# Patient Record
Sex: MALE | Race: WHITE | NOT HISPANIC OR LATINO | URBAN - METROPOLITAN AREA
[De-identification: names, ages, dates, MRNs, and addresses within clinical notes are randomized per-mention and may not be internally consistent; named-entity substitution may affect disease eponyms.]

---
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
## 2022-01-06 ENCOUNTER — OFFICE VISIT (OUTPATIENT)
Dept: OPTOMETRY | Facility: CLINIC | Age: 22
End: 2022-01-06
Payer: COMMERCIAL

## 2022-01-06 DIAGNOSIS — Q89.8 STICKLER SYNDROME: ICD-10-CM

## 2022-01-06 DIAGNOSIS — H52.13 MYOPIA OF BOTH EYES: ICD-10-CM

## 2022-01-06 DIAGNOSIS — H35.342 MACULAR HOLE OF LEFT EYE: Primary | ICD-10-CM

## 2022-01-06 ASSESSMENT — REFRACTION_MANIFEST
OS_SPHERE: -11.00
OD_AXIS: 110
OS_AXIS: 030
OS_CYLINDER: +0.50
OD_SPHERE: -3.00
OD_CYLINDER: +0.75

## 2022-01-06 ASSESSMENT — CONF VISUAL FIELD
OD_NORMAL: 1
OS_NORMAL: 1

## 2022-01-06 ASSESSMENT — CUP TO DISC RATIO
OD_RATIO: 0.2
OS_RATIO: 0.3

## 2022-01-06 ASSESSMENT — VISUAL ACUITY
OS_CC+: -2
CORRECTION_TYPE: GLASSES
OD_CC: 20/60
OD_CC+: -2
OS_CC: 20/40
METHOD: SNELLEN - LINEAR

## 2022-01-06 ASSESSMENT — TONOMETRY
OD_IOP_MMHG: 17
IOP_METHOD: ICARE
OS_IOP_MMHG: 22

## 2022-01-06 ASSESSMENT — EXTERNAL EXAM - LEFT EYE: OS_EXAM: NORMAL

## 2022-01-06 ASSESSMENT — EXTERNAL EXAM - RIGHT EYE: OD_EXAM: NORMAL

## 2022-01-06 NOTE — PROGRESS NOTES
Assessment/Plan  (H35.342) Macular hole of left eye  (primary encounter diagnosis)  Comment: Patient follows with Dr. Godwin at [facility]- is due to follow up.   Plan: Discussed findings with patient. Minimal acuity improvement today over habitual glasses, but patient is welcome to update specs if he'd like. High myopia and anisometropia may make patient a good candidate for contact lenses. Recommend CL evaluation if retina doctor is supportive. Because of patient's lack of binocularity and lack of prior history wearing CL's, insertion/removal training with an emphasis on good hygiene would be needed. Patient will schedule an appointment at [facility] with his retina specialist and may schedule a CL fitting/evaluation after that.     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 Continue care with retina clinic. Return to clinic with vision changes.     (H52.13) Myopia of both eyes  Plan: REFRACTION [[id number]]        New glasses Rx generated. No bifocal was put on right lens as patient does not use this eye for reading (it is pseudophakic).           45 minutes were spent on the date of the encounter doing chart review, history and exam, documentation, and further activities as noted above.    Complete documentation of historical and exam elements from today's encounter can  be found in the full encounter summary report (not reduplicated in this progress  note). I personally obtained the chief complaint(s) and history of present illness. I  confirmed and edited as necessary the review of systems, past medical/surgical  history, family history, social history, and examination findings as documented by  others; and I examined the patient myself. I personally reviewed the relevant tests,  images, and reports as documented above. I formulated and edited as necessary the  assessment and plan and discussed the findings and management plan with the  patient and  family.    Joseph Thomas, OD

## 2022-01-17 ENCOUNTER — TELEPHONE (OUTPATIENT)
Dept: OPHTHALMOLOGY | Facility: CLINIC | Age: 22
End: 2022-01-17
Payer: COMMERCIAL

## 2022-01-17 ENCOUNTER — OFFICE VISIT (OUTPATIENT)
Dept: OPTOMETRY | Facility: CLINIC | Age: 22
End: 2022-01-17
Payer: COMMERCIAL

## 2022-01-17 DIAGNOSIS — H35.342 MACULAR HOLE OF LEFT EYE: ICD-10-CM

## 2022-01-17 DIAGNOSIS — H52.32 ANISOMETROPIA AND ANISEIKONIA: Primary | ICD-10-CM

## 2022-01-17 DIAGNOSIS — H52.13 MYOPIA OF BOTH EYES: ICD-10-CM

## 2022-01-17 DIAGNOSIS — H52.31 ANISOMETROPIA AND ANISEIKONIA: Primary | ICD-10-CM

## 2022-01-17 DIAGNOSIS — Q89.8 STICKLER SYNDROME: ICD-10-CM

## 2022-01-17 ASSESSMENT — VISUAL ACUITY
METHOD: SNELLEN - LINEAR
OS_CC: 20/40
OD_CC: 20/70
CORRECTION_TYPE: GLASSES

## 2022-01-17 ASSESSMENT — TONOMETRY
IOP_METHOD: ICARE
OS_IOP_MMHG: 18
OD_IOP_MMHG: 17

## 2022-01-17 ASSESSMENT — REFRACTION_WEARINGRX
OD_AXIS: 110
OS_CYLINDER: +0.50
OS_AXIS: 030
SPECS_TYPE: SVL
OD_SPHERE: -3.00
OS_SPHERE: -11.00
OD_CYLINDER: +0.75

## 2022-01-17 ASSESSMENT — REFRACTION_MANIFEST
OS_CYLINDER: +0.50
OD_CYLINDER: +0.75
OS_SPHERE: -11.00
OD_AXIS: 110
OD_SPHERE: -3.00
OS_AXIS: 030

## 2022-01-17 ASSESSMENT — REFRACTION_CURRENTRX
OS_BRAND: ACUVUE OASYS 1 DAY
OD_BASECURVE: 8.5
OS_BASECURVE: 8.5
OD_CYLINDER: SPHERE
OD_SPHERE: -2.50
OS_CYLINDER: SPHERE
OD_DIAMETER: 14.3
OS_DIAMETER: 14.3
OS_SPHERE: -9.50
OD_BRAND: ACUVUE OASYS 1 DAY

## 2022-01-17 ASSESSMENT — CONF VISUAL FIELD
OS_NORMAL: 1
OD_NORMAL: 1

## 2022-01-17 ASSESSMENT — EXTERNAL EXAM - LEFT EYE: OS_EXAM: NORMAL

## 2022-01-17 ASSESSMENT — EXTERNAL EXAM - RIGHT EYE: OD_EXAM: NORMAL

## 2022-01-17 NOTE — TELEPHONE ENCOUNTER
[facility] Call Center    Phone Message    May a detailed message be left on voicemail: yes     Reason for Call: Other: Pt would like to know Dr. Lizarraga's thoughts on him wearing contacts. Would like to discuss. Thank you.     Action Taken: Message routed to:  [facility] EYE    Travel Screening: Not Applicable

## 2022-01-17 NOTE — PROGRESS NOTES
A/P  1.) Anisometropia/Aniseikonia with High Myopia left eye, low myopia right eye  -Aniso of 8D in glasses, s/p IOL dislocation right eye  -New CL fit for aniso, responds well today  -Good vision/comfort/fit with daily disposable CL's  -Successful I&R, reviewed CL care and hygiene with pt    CLRx updated. Will order 6 month supply and mail direct to pt. F/u prn with lens concerns, otherwise 1-2 years here. Seeing retina dept regularly.    I have confirmed the patient's CC, HPI and reviewed Past Medical History, Past Surgical History, Social History, Family History, Problem List, Medication List and agree with Tech note.     Brianna Anderson, LEXII FAAO FSLS    30+ min spent on the date of encounter in direct care/counseling/review of charts/prescribing/documentation    Per financial counselor [employer] should cover medically necessary diagnosis for Anisometropia/Aniseikonia    Contact Lens Billing  V-Code:  - Soft spherical  Final Contact Lens Rx       Brand Base Curve Diameter Sphere    Right Precision 1 Day 8.3 14.2 -2.50    Left Precision 1 Day 8.3 14.2 -9.50    Expiration Date: 1/17/24    Replacement: Daily    Solutions: n/a    Wearing Schedule: Daytime wear only   Rx also on MyChart        [facility] order mailed direct: [id number]  # of units: 4 boxes  Price per Unit: $60    This patient requires contact lenses that are medically necessary for either improvement in vision over spectacles, support of the ocular surface, or other therapeutic benefit. These are not cosmetic contact lenses.     Encounter Diagnoses   Name Primary?     Anisometropia and aniseikonia Yes     Myopia of both eyes      Stickler syndrome      Macular hole of left eye

## 2022-01-17 NOTE — TELEPHONE ENCOUNTER
I spoke to the patient after consulting with Dr. Lizarraga.  The patient should consult with [facility] about the contact lenses since they recently did a surgery for him.

## 2022-05-30 ENCOUNTER — HOSPITAL ENCOUNTER (EMERGENCY)
Facility: CLINIC | Age: 22
Discharge: HOME | End: 2022-05-31
Attending: EMERGENCY MEDICINE | Admitting: EMERGENCY MEDICINE
Payer: COMMERCIAL

## 2022-05-30 DIAGNOSIS — R07.9 CHEST PAIN, UNSPECIFIED TYPE: ICD-10-CM

## 2022-05-30 PROCEDURE — 93005 ELECTROCARDIOGRAM TRACING: CPT

## 2022-05-30 PROCEDURE — C9803 HOPD COVID-19 SPEC COLLECT: HCPCS

## 2022-05-30 PROCEDURE — 99285 EMERGENCY DEPT VISIT HI MDM: CPT | Mod: CS,25

## 2022-05-30 RX ORDER — KETOROLAC TROMETHAMINE 15 MG/ML
15 INJECTION, SOLUTION INTRAMUSCULAR; INTRAVENOUS ONCE
Status: COMPLETED | OUTPATIENT
Start: 2022-05-31 | End: 2022-05-31

## 2022-05-31 ENCOUNTER — APPOINTMENT (OUTPATIENT)
Dept: GENERAL RADIOLOGY | Facility: CLINIC | Age: 22
End: 2022-05-31
Attending: EMERGENCY MEDICINE
Payer: COMMERCIAL

## 2022-05-31 VITALS
DIASTOLIC BLOOD PRESSURE: 71 MMHG | TEMPERATURE: 96.6 F | HEART RATE: 60 BPM | OXYGEN SATURATION: 100 % | SYSTOLIC BLOOD PRESSURE: 125 MMHG | RESPIRATION RATE: 19 BRPM

## 2022-05-31 LAB
ANION GAP SERPL CALCULATED.3IONS-SCNC: 3 MMOL/L (ref 3–14)
ATRIAL RATE - MUSE: 85 BPM
BASOPHILS # BLD AUTO: 0 10E3/UL (ref 0–0.2)
BASOPHILS NFR BLD AUTO: 0 %
BUN SERPL-MCNC: 13 MG/DL (ref 7–30)
CALCIUM SERPL-MCNC: 9.4 MG/DL (ref 8.5–10.1)
CHLORIDE BLD-SCNC: 105 MMOL/L (ref 94–109)
CO2 SERPL-SCNC: 28 MMOL/L (ref 20–32)
CREAT SERPL-MCNC: 0.75 MG/DL (ref 0.66–1.25)
DIASTOLIC BLOOD PRESSURE - MUSE: NORMAL MMHG
EOSINOPHIL # BLD AUTO: 0.2 10E3/UL (ref 0–0.7)
EOSINOPHIL NFR BLD AUTO: 2 %
ERYTHROCYTE [DISTWIDTH] IN BLOOD BY AUTOMATED COUNT: 12.5 % (ref 10–15)
FLUAV RNA SPEC QL NAA+PROBE: NEGATIVE
FLUBV RNA RESP QL NAA+PROBE: NEGATIVE
GFR SERPL CREATININE-BSD FRML MDRD: >90 ML/MIN/1.73M2
GLUCOSE BLD-MCNC: 90 MG/DL (ref 70–99)
HCT VFR BLD AUTO: 44.5 % (ref 40–53)
HGB BLD-MCNC: 14.7 G/DL (ref 13.3–17.7)
IMM GRANULOCYTES # BLD: 0 10E3/UL
IMM GRANULOCYTES NFR BLD: 0 %
INTERPRETATION ECG - MUSE: NORMAL
LYMPHOCYTES # BLD AUTO: 3.6 10E3/UL (ref 0.8–5.3)
LYMPHOCYTES NFR BLD AUTO: 51 %
MCH RBC QN AUTO: 31.6 PG (ref 26.5–33)
MCHC RBC AUTO-ENTMCNC: 33 G/DL (ref 31.5–36.5)
MCV RBC AUTO: 96 FL (ref 78–100)
MONOCYTES # BLD AUTO: 0.9 10E3/UL (ref 0–1.3)
MONOCYTES NFR BLD AUTO: 13 %
NEUTROPHILS # BLD AUTO: 2.4 10E3/UL (ref 1.6–8.3)
NEUTROPHILS NFR BLD AUTO: 34 %
NRBC # BLD AUTO: 0 10E3/UL
NRBC BLD AUTO-RTO: 0 /100
P AXIS - MUSE: 53 DEGREES
PLATELET # BLD AUTO: 340 10E3/UL (ref 150–450)
POTASSIUM BLD-SCNC: 3.7 MMOL/L (ref 3.4–5.3)
PR INTERVAL - MUSE: 146 MS
QRS DURATION - MUSE: 98 MS
QT - MUSE: 348 MS
QTC - MUSE: 414 MS
R AXIS - MUSE: 65 DEGREES
RBC # BLD AUTO: 4.65 10E6/UL (ref 4.4–5.9)
RSV RNA SPEC NAA+PROBE: NEGATIVE
SARS-COV-2 RNA RESP QL NAA+PROBE: NEGATIVE
SODIUM SERPL-SCNC: 136 MMOL/L (ref 133–144)
SYSTOLIC BLOOD PRESSURE - MUSE: NORMAL MMHG
T AXIS - MUSE: 44 DEGREES
TROPONIN I SERPL HS-MCNC: 5 NG/L
VENTRICULAR RATE- MUSE: 85 BPM
WBC # BLD AUTO: 7.1 10E3/UL (ref 4–11)

## 2022-05-31 PROCEDURE — 85025 COMPLETE CBC W/AUTO DIFF WBC: CPT | Performed by: EMERGENCY MEDICINE

## 2022-05-31 PROCEDURE — 96374 THER/PROPH/DIAG INJ IV PUSH: CPT

## 2022-05-31 PROCEDURE — 84484 ASSAY OF TROPONIN QUANT: CPT | Performed by: EMERGENCY MEDICINE

## 2022-05-31 PROCEDURE — 71045 X-RAY EXAM CHEST 1 VIEW: CPT

## 2022-05-31 PROCEDURE — 36415 COLL VENOUS BLD VENIPUNCTURE: CPT | Performed by: EMERGENCY MEDICINE

## 2022-05-31 PROCEDURE — 250N000011 HC RX IP 250 OP 636: Performed by: EMERGENCY MEDICINE

## 2022-05-31 PROCEDURE — 87637 SARSCOV2&INF A&B&RSV AMP PRB: CPT | Performed by: EMERGENCY MEDICINE

## 2022-05-31 PROCEDURE — 80048 BASIC METABOLIC PNL TOTAL CA: CPT | Performed by: EMERGENCY MEDICINE

## 2022-05-31 RX ADMIN — KETOROLAC TROMETHAMINE 15 MG: 15 INJECTION, SOLUTION INTRAMUSCULAR; INTRAVENOUS at 00:05

## 2022-05-31 ASSESSMENT — ENCOUNTER SYMPTOMS
NAUSEA: 0
ABDOMINAL PAIN: 0
FEVER: 0
CHILLS: 0
COUGH: 0
HEADACHES: 0
VOMITING: 0
SHORTNESS OF BREATH: 0

## 2022-05-31 NOTE — ED PROVIDER NOTES
History   Chief Complaint:  Chest Pain    The history is provided by the patient.      Jalil Hernandez is a 22 year old male who presents with non-radiating upper left-sided chest pain, persisting since sudden onset approximately 8 hours ago. The patient was sitting down at work when he developed sharp pain to his chest as noted above. This pain initially worsened with movement and throughout the evening became additionally exacerbated with deep breathing. He does note some new chest congestion today, and with overall worsening symptoms grew concerned and presented to the ED at this time. The patient is otherwise well and denies any headache, fever, chills, nausea, emesis, cough, shortness of breath, or abdominal pain. He denies pertinent past medical problems including cancer, blood clotting, or other cardiac history. He denies family history of sudden cardiac death. The patient reports no drug or alcohol use. He is fully COVID-19 vaccinated and denies known exposures to this. He denies recent heavy lifting or trauma.    Review of Systems   Constitutional: Negative for chills and fever.   Respiratory: Negative for cough and shortness of breath.    Cardiovascular: Positive for chest pain (upper left-sided, non-radiating).   Gastrointestinal: Negative for abdominal pain, nausea and vomiting.   Neurological: Negative for headaches.   All other systems reviewed and are negative.    Allergies:  No known drug allergies    Medications:  The patient takes no daily medication.    Past Medical History:     Band-shaped keratopathy  Glaucoma suspect  Iritis  Myopia  Retinal detachment  Staphyloma posticum  Stickler's syndrome  Vitamin D deficiency    Past Surgical History:    Laser diode retina, combined  Reconstruction anterior chamber, right  Retinal reattachment  Cataract extraction, right  Scleral buckle  Strabismus surgery  Vitrectomy parsplana x4    Family History:    Retinal detachment    Social History:  The patient  presented with his brother.  The patient arrived in a private vehicle.    Physical Exam     Patient Vitals for the past 24 hrs:   BP Temp Temp src Pulse Resp SpO2   05/31/22 0100 125/71 -- -- 60 -- 100 %   05/31/22 0050 132/72 -- -- 57 19 100 %   05/31/22 0030 (!) 137/91 -- -- 65 15 100 %   05/31/22 0020 130/66 -- -- 59 13 100 %   05/31/22 0015 (!) 141/76 -- -- 54 18 99 %   05/30/22 2336 (!) 188/100 (!) 96.6  F (35.9  C) Temporal 77 18 100 %     Physical Exam  Nursing note and vitals reviewed.  Constitutional: Well nourished.   Eyes: Conjunctiva normal.  Pupils are equal, round, and reactive to light.   ENT: Nose normal. Mucous membranes pink and moist.    Neck: Normal range of motion.  CVS: Normal rate, regular rhythm.  Normal heart sounds.  No murmur. L. Anterior chest wall, just superior to L. Nipple with TTP, no overlying warmth/erythema/induration  Pulmonary: Lungs clear to auscultation bilaterally. No wheezes/rales/rhonchi.  GI: Abdomen soft. Nontender, nondistended. No rigidity or guarding.    MSK: No calf tenderness or swelling. No bilateral shoulder tenderness, full active ROM  Neuro: Alert. Follows simple commands.  Skin: Skin is warm and dry. No rash noted.   Psychiatric: Normal affect.     Emergency Department Course   ECG  ECG taken at 2343, ECG read at 2343  Normal sinus rhythm with sinus arrhythmia.  Normal ECG.  Rate 85 bpm. IN interval 146 ms. QRS duration 98 ms. QT/QTc 348/414 ms. P-R-T axis 53 65 44.     Imaging:  XR Chest Port 1 View   Final Result   IMPRESSION: Stable cardiomediastinal silhouette. No focal consolidation or pleural effusion.        Report per radiology    Laboratory:  Labs Ordered and Resulted from Time of ED Arrival to Time of ED Departure   BASIC METABOLIC PANEL - Normal       Result Value    Sodium 136      Potassium 3.7      Chloride 105      Carbon Dioxide (CO2) 28      Anion Gap 3      Urea Nitrogen 13      Creatinine 0.75      Calcium 9.4      Glucose 90      GFR Estimate  >90     TROPONIN I - Normal    Troponin I High Sensitivity 5     INFLUENZA A/B & SARS-COV2 PCR MULTIPLEX - Normal    Influenza A PCR Negative      Influenza B PCR Negative      RSV PCR Negative      SARS CoV2 PCR Negative     CBC WITH PLATELETS AND DIFFERENTIAL    WBC Count 7.1      RBC Count 4.65      Hemoglobin 14.7      Hematocrit 44.5      MCV 96      MCH 31.6      MCHC 33.0      RDW 12.5      Platelet Count 340      % Neutrophils 34      % Lymphocytes 51      % Monocytes 13      % Eosinophils 2      % Basophils 0      % Immature Granulocytes 0      NRBCs per 100 WBC 0      Absolute Neutrophils 2.4      Absolute Lymphocytes 3.6      Absolute Monocytes 0.9      Absolute Eosinophils 0.2      Absolute Basophils 0.0      Absolute Immature Granulocytes 0.0      Absolute NRBCs 0.0       Emergency Department Course:     Reviewed:  I reviewed nursing notes, vitals, past medical history and Care Everywhere.    Assessments:  2349 I obtained history and examined the patient as noted above.   0056 I rechecked the patient and explained findings. I believe that they are safe for discharge at this time.    Interventions:  0005 Toradol 15 mg IV    Disposition:  The patient was discharged to home.     Impression & Plan     Medical Decision Making:  Patient is a 22-year-old male presenting with chest pain.  He is noted to be hypertensive in triage though this down trended during his time in the ED.  EKG without focal ischemia or underlying arrhythmia.  High-sensitivity screening troponin negative.  His presentation would be atypical of ACS given symptoms greater than 6 hours.  PERC negative, clinically doubt PE.  Labs without profound anemia or significant electrolyte derangements.  He is not septic appearing.  He has no reproducible abdominal tenderness and I doubt intra-abdominal source to explain his pain.  Chest x-ray without focal pneumonia, fluid overload, widened mediastinum or pneumothorax.  He tested negative for  COVID-19/influenza during his time in the ED.  Patient did report some symptom improvement after IV Toradol.  Stronger suspicion that patient's pain is more secondary to costochondritis.  I did recommend Tylenol/Motrin as needed with plans for close PCP follow-up.  Return precautions given.  We also did discuss patient's noted hypertension on arrival and recommendation to follow-up with PCP and maintain blood pressure diary at home though stronger suspicion that his initial elevated blood pressure was more secondary to cuff sizing error.    Diagnosis:    ICD-10-CM    1. Chest pain, unspecified type  R07.9      Scribe Disclosure:  I, Kori Salazar, am serving as a scribe at 11:42 PM on 5/30/2022 to document services personally performed by Genevieve Beebe DO based on my observations and the provider's statements to me.       Genevieve Beebe,   05/31/22 0131

## 2022-05-31 NOTE — ED TRIAGE NOTES
Pt arrives to ED ith CP that began at 1600. No meds PTA. Denies hx. Pain worse with breathing and movement.

## 2022-11-20 ENCOUNTER — HEALTH MAINTENANCE LETTER (OUTPATIENT)
Age: 22
End: 2022-11-20

## 2023-02-08 DIAGNOSIS — H35.342 MACULAR HOLE OF LEFT EYE: ICD-10-CM

## 2023-02-08 DIAGNOSIS — H40.003 GLAUCOMA SUSPECT OF BOTH EYES: Primary | ICD-10-CM

## 2023-02-22 ENCOUNTER — OFFICE VISIT (OUTPATIENT)
Dept: OPHTHALMOLOGY | Facility: CLINIC | Age: 23
End: 2023-02-22
Attending: OPHTHALMOLOGY
Payer: COMMERCIAL

## 2023-02-22 DIAGNOSIS — H40.003 GLAUCOMA SUSPECT OF BOTH EYES: ICD-10-CM

## 2023-02-22 DIAGNOSIS — H35.342 MACULAR HOLE OF LEFT EYE: ICD-10-CM

## 2023-02-22 PROCEDURE — 99214 OFFICE O/P EST MOD 30 MIN: CPT | Performed by: OPHTHALMOLOGY

## 2023-02-22 PROCEDURE — 92133 CPTRZD OPH DX IMG PST SGM ON: CPT | Performed by: OPHTHALMOLOGY

## 2023-02-22 PROCEDURE — G0463 HOSPITAL OUTPT CLINIC VISIT: HCPCS | Mod: 25

## 2023-02-22 PROCEDURE — 99207 OCT OPTIC NERVE RNFL SPECTRALIS OU (BOTH EYES): CPT | Mod: 26 | Performed by: OPHTHALMOLOGY

## 2023-02-22 PROCEDURE — 99207 FUNDUS PHOTOS OU (BOTH EYES): CPT | Mod: 26 | Performed by: OPHTHALMOLOGY

## 2023-02-22 PROCEDURE — 92134 CPTRZ OPH DX IMG PST SGM RTA: CPT | Performed by: OPHTHALMOLOGY

## 2023-02-22 PROCEDURE — 92250 FUNDUS PHOTOGRAPHY W/I&R: CPT | Mod: 59 | Performed by: OPHTHALMOLOGY

## 2023-02-22 RX ORDER — TRETINOIN 0.5 MG/G
CREAM TOPICAL
COMMUNITY
Start: 2022-03-07 | End: 2023-08-31

## 2023-02-22 RX ORDER — CLINDAMYCIN PHOSPHATE 10 UG/ML
LOTION TOPICAL PRN
COMMUNITY
Start: 2022-01-21

## 2023-02-22 ASSESSMENT — VISUAL ACUITY
CORRECTION_TYPE: GLASSES
OD_CC+: -1
OD_CC: 20/60
OS_CC+: -1
METHOD: SNELLEN - LINEAR
OS_CC: 20/40

## 2023-02-22 ASSESSMENT — CONF VISUAL FIELD
OD_INFERIOR_NASAL_RESTRICTION: 0
OS_INFERIOR_TEMPORAL_RESTRICTION: 0
OD_SUPERIOR_NASAL_RESTRICTION: 0
OD_INFERIOR_TEMPORAL_RESTRICTION: 0
OS_INFERIOR_NASAL_RESTRICTION: 0
OD_NORMAL: 1
METHOD: COUNTING FINGERS
OD_SUPERIOR_TEMPORAL_RESTRICTION: 0
OS_SUPERIOR_TEMPORAL_RESTRICTION: 0
OS_SUPERIOR_NASAL_RESTRICTION: 0
OS_NORMAL: 1

## 2023-02-22 ASSESSMENT — REFRACTION_WEARINGRX
OD_SPHERE: -3.00
OS_AXIS: 030
OS_SPHERE: -11.00
OD_AXIS: 110
OS_CYLINDER: +0.50
OD_CYLINDER: +0.75
SPECS_TYPE: SVL

## 2023-02-22 ASSESSMENT — CUP TO DISC RATIO
OD_RATIO: 0.2
OS_RATIO: 0.3

## 2023-02-22 ASSESSMENT — TONOMETRY
OS_IOP_MMHG: 20
OD_IOP_MMHG: 17
IOP_METHOD: ICARE

## 2023-02-22 ASSESSMENT — EXTERNAL EXAM - LEFT EYE: OS_EXAM: NORMAL

## 2023-02-22 ASSESSMENT — SLIT LAMP EXAM - LIDS: COMMENTS: INFERIOR TEMPORAL PROMINENCE OF THE SCLERAL BUCKLE. NO EXPOSURE, BUT POSSIBLE SLIGHTLY DISPLACEMENT

## 2023-02-22 ASSESSMENT — EXTERNAL EXAM - RIGHT EYE: OD_EXAM: NORMAL

## 2023-02-22 NOTE — PROGRESS NOTES
CC -   Follow up stickler's s  HPI -  Jalil Hernandez is a  20 year old year-old patient with h/o Stickler's syndrome, with h/o multiple RD repair OD, laser pexy OS, and also RD OS s/p repair with SB.  Seen in past at [facility] by Antonio Pascal, Klesert, and Carlos A, now following with Kirsty Almazan and [facility].    s/p AC washout  OD for retained Sommering ring (12/19/18) by Dr. Almazan. He was diagnosed with uveitis in the right eye, now stable.  Status post Macula hole repair left eyeat [facility]    INTERVAL HISTORY - reports decreased vision left eye possibly over the past 6 months. No new  floaters and flashes in the left eye . No pain.   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 COL2A1 is associated with a spectrum of autosomal dominant and recessive conditions affecting the skeletal system.    PAST OCULAR SURGERY  AC washout OD 12/19/18 by Dr. Almazan  RD repair OD (multiple, last PPV/SO and SOR) 2005 & 2010 (Klesert)  SBP OS 2014 ([facility])  CE/IOL OD 2005 ([facility])    Ophtho Meds: None  Patient on pre-exams for nurse career    OCULAR IMAGING    OCT 02/22/23   right eye- retina thinning. vitreous traction,  retinoschisis  left eye- Macula hole closed. Retina thinning    OVF 06/10/21  LVF right eye: peripheral decreased sensitivity- does not appear glaucoma. Patient with descentered intraocular lens possibly causing visual field defects. FP 8%  OVF24-2 left eye: normal     ONFL 02/22/23   Right eye: T and some N thinning with possibly progression since 2013  Left eye: normal     PHOTOS optos 02/22/23  consistent with exam     ASSESSMENT & PLAN    # history of Stickler's syndrome  - reviewed RT/RD precautions  - genetic report: 03/03/22 COL2A1 is associated with a spectrum of autosomal dominant and recessive conditions affecting the skeletal system.    # history of full thickness macula hole, left eye  Status post  repair at [place] 8.13.2021 Dr. Godwin  - noted on exam (4-14-21) unclear onset. Patient reports 6 months of blurry vision left eye   Macula hole closed     # History of RD repair OD with residual TRD   - last surgery 2010 with PPV/SO and SO removal   - small residual superior peripheral TRD noted by JT in 2014, stable    # History of RD repair OS   - s/p prophlyactic pexy (Klesert)   - s/p SBP 2014 (JT)   - status post additional laser treatment 9.12.18 and 9.25.18   - scleral buckle displaced anteriorly, note exposed. Noted since 9.2018. No exposure. Per patient, no eye pain. Does not seem to Bother him. Unclear if displacement of buckle related to new Macula hole    -  no new tears, everything behind laser barricade.     # ERM OD with retinal traction   - may be residual posterior hyaloid- stable    - OCT shows some VRT with CME temporal mid-periphery   - OCT shows some SO under Epiretinal membrane    # History of Anterior uveitis, right eye   - likely was 2/2 retained of Sommering ring in anterior chamber causing inflammation   - s/p AC washout, retained silicone oil removed, loose fragment removed, but residual fragment imbedded in iris inferiorly and was left in place 12.2018   - no corneal edema; no activity   - discussed return precautions, restrictions    # Phacodonesis right eye, lens slightly  dislocated superiorly   - may need lens exchange if worsens, monitor for now     #  History of ocular hypertension   - ONFL left eye within normal limits; right eye with possible worsening thinning compared to 2021; possible inter-test variability and optic nerve tilted; left eye is normal    - IOP 17/20   - currently off eyedrops   - optic nerve with small cups; retina thinning  OVF 06/10/21 LVF right eye: peripheral decreased sensitivity- does not appear glaucoma. Patient with descentered intraocular lens possibly causing visual field defects. FP 8%  OVF24-2 left eye: normal   Recommend LVF right eye OVF24-2 left  eye next follow up - if worsening will consider glaucoma meds     # hordeolum   warm compresses and artificial tears  As needed     PLAN:   Follow up 6 months with retinal nerve fiber layer both eyes; LVF right eye OVF24-2 left eye. Macula Optical Coherence Tomography; dilation - if worsening will consider starting glaucoma meds   Retina detachment precautions were discussed with the patient (presence or increased in flashes, floaters or a curtain in the visual field) and was asked to return if any of the those occur   warm compresses and artificial tears      ~~~~~~~~~~~~~~~~~~~~~~~~~~~~~~~~~~   Complete documentation of historical and exam elements from today's encounter can be found in the full encounter summary report (not reduplicated in this progress note).  I personally obtained the chief complaint(s) and history of present illness.  I confirmed and edited as necessary the review of systems, past medical/surgical history, family history, social history, and examination findings as documented by others; and I examined the patient myself.  I personally reviewed the relevant tests, images, and reports as documented above.  I formulated and edited as necessary the assessment and plan and discussed the findings and management plan with the patient and family    Marine Lizarraga MD   of Ophthalmology.  Retina Service   Department of Ophthalmology and Visual Neurosciences   [facility]  Phone: [phone]   Fax: [phone]

## 2023-02-22 NOTE — NURSING NOTE
Chief Complaints and History of Present Illnesses   Patient presents with     Annual Eye Exam     Chief Complaint(s) and History of Present Illness(es)     Annual Eye Exam            Laterality: both eyes    Associated symptoms: floaters.  Negative for eye pain and flashes    Treatments tried: no treatments          Comments    Here for annual exam. Vision has decreased since last eye exam. Stable floaters without flashes. No eye pain.    Kendall MUHAMMAD 12:47 PM February 22, 2023

## 2023-06-27 ENCOUNTER — TELEPHONE (OUTPATIENT)
Dept: INTERNAL MEDICINE | Facility: CLINIC | Age: 23
End: 2023-06-27
Payer: COMMERCIAL

## 2023-06-27 NOTE — TELEPHONE ENCOUNTER
No same day openings today.  Patient can be scheduled wherever I have same-day openings or he can be scheduled with any  provider with openings

## 2023-06-27 NOTE — TELEPHONE ENCOUNTER
Patient called back to reschedule appt. CS told him about the note that ok's same day and provider approval spots can be used. He stated he assumed that meant he can have his px today. Writer explained the hold spots on providers scheduled and offered him first appt 8/1/2023. Patient said that was too far out and wants to be seen today since the note stated same day and wants to talk with a RN  Ok to call and  [phone]

## 2023-08-01 ENCOUNTER — TRANSFERRED RECORDS (OUTPATIENT)
Dept: HEALTH INFORMATION MANAGEMENT | Facility: CLINIC | Age: 23
End: 2023-08-01

## 2023-08-03 DIAGNOSIS — H40.003 GLAUCOMA SUSPECT OF BOTH EYES: ICD-10-CM

## 2023-08-03 DIAGNOSIS — H35.342 MACULAR HOLE OF LEFT EYE: Primary | ICD-10-CM

## 2023-08-21 ENCOUNTER — OFFICE VISIT (OUTPATIENT)
Dept: OPHTHALMOLOGY | Facility: CLINIC | Age: 23
End: 2023-08-21
Attending: OPHTHALMOLOGY
Payer: COMMERCIAL

## 2023-08-21 DIAGNOSIS — H40.003 GLAUCOMA SUSPECT OF BOTH EYES: ICD-10-CM

## 2023-08-21 DIAGNOSIS — H18.511 ENDOTHELIAL CORNEAL DYSTROPHY OF RIGHT EYE: Primary | ICD-10-CM

## 2023-08-21 DIAGNOSIS — H35.342 MACULAR HOLE OF LEFT EYE: ICD-10-CM

## 2023-08-21 DIAGNOSIS — H44.711: ICD-10-CM

## 2023-08-21 PROCEDURE — 92133 CPTRZD OPH DX IMG PST SGM ON: CPT | Performed by: OPHTHALMOLOGY

## 2023-08-21 PROCEDURE — G0463 HOSPITAL OUTPT CLINIC VISIT: HCPCS | Performed by: OPHTHALMOLOGY

## 2023-08-21 PROCEDURE — 92083 EXTENDED VISUAL FIELD XM: CPT | Performed by: OPHTHALMOLOGY

## 2023-08-21 PROCEDURE — 99214 OFFICE O/P EST MOD 30 MIN: CPT | Mod: GC | Performed by: OPHTHALMOLOGY

## 2023-08-21 PROCEDURE — 92286 ANT SGM IMG I&R SPECLR MIC: CPT | Performed by: OPHTHALMOLOGY

## 2023-08-21 PROCEDURE — 92134 CPTRZ OPH DX IMG PST SGM RTA: CPT | Performed by: OPHTHALMOLOGY

## 2023-08-21 PROCEDURE — 99207 OCT OPTIC NERVE RNFL SPECTRALIS OU (BOTH EYES): CPT | Mod: 26 | Performed by: OPHTHALMOLOGY

## 2023-08-21 RX ORDER — FINASTERIDE 5 MG/1
TABLET, FILM COATED ORAL EVERY MORNING
COMMUNITY

## 2023-08-21 ASSESSMENT — CONF VISUAL FIELD
OD_INFERIOR_NASAL_RESTRICTION: 0
OD_SUPERIOR_TEMPORAL_RESTRICTION: 0
OS_INFERIOR_TEMPORAL_RESTRICTION: 0
OS_INFERIOR_NASAL_RESTRICTION: 0
OD_INFERIOR_TEMPORAL_RESTRICTION: 0
OS_NORMAL: 1
OD_SUPERIOR_NASAL_RESTRICTION: 0
OS_SUPERIOR_TEMPORAL_RESTRICTION: 0
METHOD: COUNTING FINGERS
OS_SUPERIOR_NASAL_RESTRICTION: 0
OD_NORMAL: 1

## 2023-08-21 ASSESSMENT — REFRACTION_WEARINGRX
OD_AXIS: 110
OD_CYLINDER: +0.75
OS_AXIS: 030
OS_SPHERE: -11.00
OS_CYLINDER: +0.50
OD_SPHERE: -3.00
SPECS_TYPE: SVL

## 2023-08-21 ASSESSMENT — VISUAL ACUITY
OS_CC+: -2
OS_CC: 20/40
CORRECTION_TYPE: GLASSES
OD_CC+: -1
OD_CC: 20/60
METHOD: SNELLEN - LINEAR

## 2023-08-21 ASSESSMENT — SLIT LAMP EXAM - LIDS: COMMENTS: INFERIOR TEMPORAL PROMINENCE OF THE SCLERAL BUCKLE. NO EXPOSURE, BUT POSSIBLE SLIGHTLY DISPLACEMENT

## 2023-08-21 ASSESSMENT — PACHYMETRY
OD_CT(UM): 597
OS_CT(UM): 604

## 2023-08-21 ASSESSMENT — EXTERNAL EXAM - RIGHT EYE: OD_EXAM: NORMAL

## 2023-08-21 ASSESSMENT — CUP TO DISC RATIO
OD_RATIO: 0.2
OS_RATIO: 0.3

## 2023-08-21 ASSESSMENT — EXTERNAL EXAM - LEFT EYE: OS_EXAM: NORMAL

## 2023-08-21 ASSESSMENT — TONOMETRY
OD_IOP_MMHG: 14
OS_IOP_MMHG: 13
IOP_METHOD: TONOPEN

## 2023-08-21 NOTE — NURSING NOTE
Chief Complaints and History of Present Illnesses   Patient presents with    Follow Up     History of Stickler's Syndrome     Chief Complaint(s) and History of Present Illness(es)       Follow Up              Laterality: both eyes    Course: stable    Associated symptoms: floaters (stable, right eye).  Negative for dryness, eye pain and flashes    Treatments tried: no treatments    Pain scale: 0/10    Comments: History of Stickler's Syndrome              Comments    He states that his vision has seemed stable in both eyes, since his last eye exam.  Patient denies having any eye discomfort.    JB Gu 7:12 AM  August 21, 2023

## 2023-08-21 NOTE — Clinical Note
This patient you saw in the past 2018 for Anterior chamber wash out and lens fragment removal right eye. . History of sticklers Retinal detachment  repair right eye with Silicone Oil. Now still has a bubble of oil in the Anterior chamber. today ECC 08/21/23  Right eye: 1575 CD, 573 pachy Left eye: 2402 CD, 552 pachy  He could only find an sneha with you end of sept. He wants Anterior chamber wash out again before sept 11 I scheduled him for next week Anterior chamber wash out with me and I wanted to check with you ok from the K point of view, as the K does not seem too thick yet.  Thanks  sim

## 2023-08-21 NOTE — PROGRESS NOTES
CC -   Follow up stickler's s  HPI -  Jalil Hernandez is a  20 year old year-old patient with h/o Stickler's syndrome, with h/o multiple RD repair OD, laser pexy OS, and also RD OS s/p repair with SB.  Seen in past at [facility] by Antonio Pascal, Klesert, and Carlos A, now following with Kirsty Almazan and Elham.    s/p AC washout  OD for retained Sommering ring (12/19/18) by Dr. Almazan. He was diagnosed with uveitis in the right eye, now stable.  Status post Macula hole repair left eyeat [place]    INTERVAL HISTORY - Feels that vision is better since last visit, no new flashes/floaters/photosensitivity. 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 COL2A1 is associated with a spectrum of autosomal dominant and recessive conditions affecting the skeletal system.    PAST OCULAR SURGERY  AC washout OD 12/19/18 by Dr. Almazan  RD repair OD (multiple, last PPV/SO and SOR) 2005 & 2010 (Klesert)  SBP OS 2014 (PAL)  CE/IOL OD 2005 ([facility])    Ophtho Meds: None  Patient on pre-exams for nurse career    OCULAR IMAGING    ECC 08/21/23  Right eye: 1575 CD, 573 pachy  Left eye: 2402 CD, 552 pachmetry    OCT mac 08/21/23   right eye- retina thinning. vitreous traction,  retinoschisis, subfoveal fluid  left eye- Macula hole closed. Retina thinning    OVF 08/21/23   LVF right eye: peripheral decreased sensitivity- does not appear glaucoma. Patient with descentered intraocular lens possibly causing visual field defects. FP 3/12, MD -10.3 > -7.3  OVF24-2 left eye: enlarged blind spot, scattered central defects, MD -4.9 > 05.4    ONFL 08/21/23   Right eye: No segmentation data available, grossly similar nerve thickness but increased tractional component compared to 5/2021  Left eye: possible supratemporal thinning although segmentation inconsistent between scans    PHOTOS optos 02/22/23  consistent with exam     ASSESSMENT & PLAN    #Silicone oil in anterior chamber right eye     -s/p AC washout, retained silicone oil removed, loose fragment removed, but residual fragment imbedded in iris inferiorly and was left in place 12.2018   -08/21/23 presents with new silicone oil bubble and persistent band K, increased K thickness and decreased endothelial cell count  ECC 08/21/23  Right eye: 1575 CD, 573 pachy  Left eye: 2402 CD, 552 pachmetry    Recommend Anterior chamber wash out right eye   30 min surgery  Peribulbar block  I reviewed the indications, risks, benefits, and alternatives of the proposed surgical procedure including, but not limited to, failure to improve vision or further loss of vision,  and need for additional surgery, bleeding, infection, loss of vision and the remote possibility of complications of anesthesia. 1:1000 risk of infection/bleed/loss of eye; 1:100 risk of RD and need for further surgery.  Patient agreed to proceed with surgery.  I provided multiple opportunities for the questions, answered all questions to the best of my ability, and confirmed that my answers and my discussion were understood.     Recommend cornea evaluation    # history of Stickler's syndrome  - reviewed RT/RD precautions  - genetic report: 03/03/22 COL2A1 is associated with a spectrum of autosomal dominant and recessive conditions affecting the skeletal system.  Retina attached     # history of full thickness macula hole, left eye  Status post repair at [facility] 8.13.2021 Dr. Godwin  - noted on exam (4-14-21) unclear onset.   Macula hole closed     # History of RD repair OD with residual TRD   - last surgery 2010 with PPV/SO and SO removal   - small residual superior peripheral TRD noted by JT in 2014, stable    # History of RD repair OS   - s/p prophlyactic pexy (Klesert)   - s/p SBP 2014 (JT)   - status post additional laser treatment 9.12.18 and 9.25.18   - scleral buckle displaced anteriorly, not exposed. Noted since 9.2018. Per patient, no eye pain   -  no new tears, everything behind laser  barricade.     # ERM OD with retinal traction   - may be residual posterior hyaloid- stable    - OCT shows some VRT with CME temporal mid-periphery   - OCT shows some SO under Epiretinal membrane    # History of Anterior uveitis, right eye   - likely was 2/2 retained of Sommering ring in anterior chamber causing inflammation   - s/p AC washout, retained silicone oil removed, loose fragment removed, but residual fragment imbedded in iris inferiorly and was left in place 12.2018   - no corneal edema; no activity   - discussed return precautions, restrictions    #Cataract left eye  Posterior subcapsular cataract (PSC)    -Monitor, operate when visually significant. Patient happy with his vision    # Phacodonesis right eye, lens slightly  dislocated superiorly   - may need lens exchange if worsens, monitor for now     #  History of ocular hypertension   - ONFL 08/21/23 possible supratemporal thinning left eye although segmentation inconsistent between scans   - IOP 14/13   - currently off eyedrops   - optic nerve with small cups; retina thinning  -OVF each eye 08/21/23 stable from prior    PLAN:   Follow up POD1    In the future Follow up 6 months with OCT mac/RNFL each eye and VTD  - if worsening will consider starting glaucoma meds     Retina detachment precautions were discussed with the patient (presence or increased in flashes, floaters or a curtain in the visual field) and was asked to return if any of the those occur   warm compresses and artificial tears      Thank you for entrusting us with your care  Jadyn Al MD, PGY3  Ophthalmology Resident  [facility]    ~~~~~~~~~~~~~~~~~~~~~~~~~~~~~~~~~~   Complete documentation of historical and exam elements from today's encounter can be found in the full encounter summary report (not reduplicated in this progress note).  I personally obtained the chief complaint(s) and history of present illness.  I confirmed and edited as necessary the review of  systems, past medical/surgical history, family history, social history, and examination findings as documented by others; and I examined the patient myself.  I personally reviewed the relevant tests, images, and reports as documented above.  I personally reviewed the ophthalmic test(s) associated with this encounter, agree with the interpretation(s) as documented by the resident/fellow, and have edited the corresponding report(s) as necessary.   I formulated and edited as necessary the assessment and plan and discussed the findings and management plan with the patient and family    Marine Lizarraga MD   of Ophthalmology.  Retina Service   Department of Ophthalmology and Visual Neurosciences   [facility]  Phone: [phone]   Fax: [phone]

## 2023-08-29 ENCOUNTER — TELEPHONE (OUTPATIENT)
Dept: OPHTHALMOLOGY | Facility: CLINIC | Age: 23
End: 2023-08-29
Payer: COMMERCIAL

## 2023-08-29 PROBLEM — H44.711: Status: ACTIVE | Noted: 2023-08-21

## 2023-08-29 NOTE — TELEPHONE ENCOUNTER
Patient is scheduled for surgery with Dr. Marine Lizarraga     Spoke with: Jalil     Date of Surgery: 09/05     Location: [facility]: [address] [place] [id number]     H&P will be completed at: PAC 08/31     Post Op scheduled on 09/06 and 09/14     Surgery packet was sent to [facility]     Additional comments: Advised RN will call 1 - 3 business days prior with arrival time and instructions. Informed patient they will need an adult  and responsible adult to stay with for 24 hours following surgery

## 2023-08-30 NOTE — TELEPHONE ENCOUNTER
FUTURE VISIT INFORMATION      SURGERY INFORMATION:  Date: 23  [facility] or  Surgeon:  Mraine Lizarraga MD   Anesthesia Type:  mac with block  Procedure: Anterior chamber washout right eye   Consult: ov 23    RECORDS REQUESTED FROM:       Primary Care Provider: [facility]    Most recent EKG+ Tracin22

## 2023-08-31 ENCOUNTER — OFFICE VISIT (OUTPATIENT)
Dept: SURGERY | Facility: CLINIC | Age: 23
End: 2023-08-31
Payer: COMMERCIAL

## 2023-08-31 ENCOUNTER — PRE VISIT (OUTPATIENT)
Dept: SURGERY | Facility: CLINIC | Age: 23
End: 2023-08-31

## 2023-08-31 ENCOUNTER — ANESTHESIA EVENT (OUTPATIENT)
Dept: SURGERY | Facility: AMBULATORY SURGERY CENTER | Age: 23
End: 2023-08-31
Payer: COMMERCIAL

## 2023-08-31 VITALS
RESPIRATION RATE: 16 BRPM | TEMPERATURE: 97.9 F | DIASTOLIC BLOOD PRESSURE: 71 MMHG | WEIGHT: 188.6 LBS | HEIGHT: 69 IN | BODY MASS INDEX: 27.93 KG/M2 | OXYGEN SATURATION: 100 % | SYSTOLIC BLOOD PRESSURE: 161 MMHG | HEART RATE: 85 BPM

## 2023-08-31 DIAGNOSIS — H44.711: ICD-10-CM

## 2023-08-31 DIAGNOSIS — Z01.818 PREOP EXAMINATION: Primary | ICD-10-CM

## 2023-08-31 PROCEDURE — 99203 OFFICE O/P NEW LOW 30 MIN: CPT | Performed by: PHYSICIAN ASSISTANT

## 2023-08-31 ASSESSMENT — ENCOUNTER SYMPTOMS: SEIZURES: 0

## 2023-08-31 ASSESSMENT — PAIN SCALES - GENERAL: PAINLEVEL: NO PAIN (0)

## 2023-08-31 ASSESSMENT — LIFESTYLE VARIABLES: TOBACCO_USE: 0

## 2023-08-31 NOTE — H&P
Pre-Operative H & P     CC:  Preoperative exam to assess for increased cardiopulmonary risk while undergoing surgery and anesthesia.    Date of Encounter: 8/31/2023  Primary Care Physician:  Uriel Ortiz     Reason for visit:   Encounter Diagnoses   Name Primary?    Retained intraocular foreign body in anterior chamber of right eye Yes    Preop examination        HPI  Jalil Hernandez is a 23 year old male who presents for pre-operative H & P in preparation for  Procedure Information       Case: [id number] Date/Time: 09/05/23 1040    Procedure: Anterior chamber washout right eye (Right: Eye)    Anesthesia type: MAC with Block    Diagnosis: Retained intraocular foreign body in anterior chamber of right eye [H44.711]    Pre-op diagnosis: Retained intraocular foreign body in anterior chamber of right eye [H44.711]    Location: [facility]  / [facility]    Providers: Marine Lizarraga MD              Mr. Hernandez has a h/o Stickler's syndrome, with h/o multiple RD repair OD, laser pexy OS, and also RD OS s/p repair with SB.  Seen in past at [facility] by Antonio Pascal, Klesert, and Carlos A, now following with Kristy Almazan and Elham.    s/p AC washout  OD for retained Sommering ring (12/19/18) by Dr. Almazan. He was diagnosed with uveitis in the right eye, now stable.  Status post Macula hole repair left eyeat [place]. He now presents for the above procedure.    PMH is otherwise unremarkable.    History is obtained from the patient and chart review. He is accompanied by his brother.    Hx of abnormal bleeding or anti-platelet use: denies      Past Medical History  Past Medical History:   Diagnosis Date    Band-shaped keratopathy     RE    Glaucoma suspect     BE    Iritis     RE    Myopia     BE    RETINAL DETACHMENT NEC     BE    Staphyloma posticum     RE    Stickler's syndrome     Vitamin D deficiency 2019       Past Surgical History  Past Surgical History:   Procedure  Laterality Date    EXAM UNDER ANESTHESIA, LASER DIODE RETINA, COMBINED  4/15/2008    laser retinopexy LE    RECONSTRUCT ANTERIOR CHAMBER Right 12/18/2018    Procedure: Right Eye Anterior Chamber Wash Out and Lens Fragment Removal;  Surgeon: Tutu Almazan MD;  Location: [facility]    RETINAL REATTACHMENT      SCLERAL BUCKLE  1/15/2014    LE    STRABISMUS SURGERY  8/13/2009    Bilateral medial rectus recession, bilateral inferior oblique myectomy    VITRECTOMY PARSPLANA  5/17/2005    PPV, EL, SO RE    VITRECTOMY PARSPLANA  12/27/2005    PPV, PPL, SO removal RE    VITRECTOMY PARSPLANA  3/17/2010    PPV, EL, MP, PI RE    VITRECTOMY PARSPLANA  8/11/2010    PPV, EL, SO removal RE       Prior to Admission Medications  Current Outpatient Medications   Medication Sig Dispense Refill    clindamycin (CLEOCIN T) 1 % external lotion Apply topically as needed      finasteride (PROSCAR) 5 MG tablet Take by mouth every morning 1/4 tablet every morning      multivitamin w/minerals (MULTI-VITAMIN) tablet Take 1 tablet by mouth daily         Allergies  No Known Allergies    Social History  Social History     Socioeconomic History    Marital status: Single     Spouse name: Not on file    Number of children: Not on file    Years of education: Not on file    Highest education level: Not on file   Occupational History    Not on file   Tobacco Use    Smoking status: Never    Smokeless tobacco: Never    Tobacco comments:     No one in family smokes.   Substance and Sexual Activity    Alcohol use: No    Drug use: No    Sexual activity: Never     Comment: tom june 2019   Other Topics Concern    Not on file   Social History Narrative    Not on file     Social Determinants of Health     Financial Resource Strain: Not on file   Food Insecurity: Not on file   Transportation Needs: Not on file   Physical Activity: Not on file   Stress: Not on file   Social Connections: Not on file   Intimate Partner Violence: Not on file   Housing Stability:  "Not on file       Family History  Family History   Problem Relation Age of Onset    Family History Negative Mother     Family History Negative Father     Retinal detachment Brother     Glaucoma No family hx of     Macular Degeneration No family hx of     Anesthesia Reaction No family hx of     Deep Vein Thrombosis (DVT) No family hx of        Review of Systems  The complete review of systems is negative other than noted in the HPI or here.     Anesthesia Evaluation   Pt has had prior anesthetic.     No history of anesthetic complications       ROS/MED HX  ENT/Pulmonary:  - neg pulmonary ROS  (-) tobacco use, asthma and sleep apnea   Neurologic: Comment: Stickler syndrome, s/p multiple eye procedures   (-) no seizures and no CVA   Cardiovascular:  - neg cardiovascular ROS     METS/Exercise Tolerance: >4 METS    Hematologic:  - neg hematologic  ROS  (-) history of blood clots and history of blood transfusion   Musculoskeletal:  - neg musculoskeletal ROS     GI/Hepatic:  - neg GI/hepatic ROS  (-) GERD and liver disease   Renal/Genitourinary:  - neg Renal ROS  (-) renal disease   Endo:  - neg endo ROS  (-) Type II DM   Psychiatric/Substance Use:  - neg psychiatric ROS     Infectious Disease:  - neg infectious disease ROS     Malignancy:  - neg malignancy ROS     Other: Comment: Acne vulgaris   - neg other ROS          BP (!) 161/71 (BP Location: Left arm, Patient Position: Sitting, Cuff Size: Adult Regular)   Pulse 85   Temp 97.9  F (36.6  C) (Oral)   Resp 16   Ht 1.753 m (5' 9\")   Wt 85.5 kg (188 lb 9.6 oz)   SpO2 100%   BMI 27.85 kg/m      Physical Exam  Constitutional: Awake, alert, cooperative, no apparent distress, and appears stated age.  Eyes: Pupils equal, round and reactive to light, extra ocular muscles intact, sclera clear, conjunctiva normal.  HENT: Normocephalic, oral pharynx with moist mucus membranes, good dentition. No goiter appreciated. Removable upper retainer.  Respiratory: Clear to " auscultation bilaterally, no crackles or wheezing. No SOB when supine.  Cardiovascular: Regular rate and rhythm, normal S1 and S2, and no murmur noted.  Carotids +2, no bruits. No edema. Palpable pulses to radial, DP and PT arteries.   GI: Normal bowel sounds, soft, non-distended, non-tender, no masses palpated.    Lymph/Hematologic: No cervical lymphadenopathy and no supraclavicular lymphadenopathy.  Genitourinary:  deferred  Skin: Warm and dry.  No rashes.   Musculoskeletal: Full ROM of neck. There is no redness, warmth, or swelling of the joints. Gross motor strength is normal.    Neurologic: Awake, alert, oriented to name, place and time. Cranial nerves II-XII are grossly intact. Gait is normal. Ambulates from chair to exam table, seats self, lies supine and sits back up w/o assistance.  Neuropsychiatric: Calm, cooperative. Normal affect. Pleasant. Answers questions appropriately, follows commands w/o difficulty.        PRIOR LABS/DIAGNOSTIC STUDIES:    All labs and imaging personally reviewed        The patient's records and results personally reviewed by this provider.       Assessment    Jalil Hernandez is a 23 year old male seen as a PAC referral for risk assessment and optimization for anesthesia.    Plan/Recommendations  Pt will be optimized for the proposed procedure.  See below for details on the assessment, risk, and preoperative recommendations    NEUROLOGY  - No history of TIA, CVA or seizure    -Post Op delirium risk factors:  No risk identified    EENT  - No current airway concerns.  Will need to be reassessed day of surgery.  Mallampati: II  TM: > 3  - Stickler syndrome, s/p multiple eye procedures    CARDIAC  - No history of CAD, Hypertension, and Afib  - METS (Metabolic Equivalents)  Patient performs 4 or more METS exercise without symptoms            Total Score: 0      RCRI-Very low risk: Class 1 0.4% complication rate            Total Score: 0        PULMONARY  DEDE Low Risk            Total  "Score: 1    DEDE: Male      - Denies asthma or inhaler use  - Tobacco History    History   Smoking Status    Never   Smokeless Tobacco    Never     Comment: No one in family smokes.       GI  - Denies GERD  PONV Low Risk  Total Score: 1           1 AN PONV: Patient is not a current smoker            ENDOCRINE    - BMI: Estimated body mass index is 27.85 kg/m  as calculated from the following:    Height as of this encounter: 1.753 m (5' 9\").    Weight as of this encounter: 85.5 kg (188 lb 9.6 oz).  Healthy Weight (BMI 18.5-24.9)  - No history of Diabetes Mellitus    HEME  VTE Low Risk 0.5%            Total Score: 2    VTE: Male      - No history of abnormal bleeding or antiplatelet use.        The patient is aware that the final anesthesia plan will be decided by the assigned anesthesia provider on the date of service.      The patient is optimized for their procedure. AVS with information on surgery time/arrival time, meds and NPO status given by nursing staff. No further diagnostic testing indicated.      On the day of service:     Prep time: 12 minutes  Visit time: 17 minutes  Documentation time: 9 minutes  ------------------------------------------  Total time: 38 minutes      Meagan Mann PA-C  Preoperative Assessment Center  [facility]  Clinic and Surgery Center  Phone: [phone]  Fax: [phone]    "

## 2023-08-31 NOTE — PATIENT INSTRUCTIONS
Preparing for Your Surgery      Name:  Jalil Hernandez   MRN:  [id number]   :  2000   Today's Date:  2023         Arriving for surgery:  Surgery date:  23  Arrival time:  9:10 am  Surgery time: 10:40 am    Restrictions due to COVID 19:    Please maintain social distance.  Masking is optional        parking is available for anyone with mobility limitations or disabilities. (Monday- Friday 7 am- 5 pm)    Please come to:    [facility]  [address] [place] [id number]    Check in on the 5th floor, Ambulatory Surgery Center.    What can I eat or drink?    -  You may eat and drink normally until 8 hours before arrival time  (Until 1:10 am)  -  You may have clear liquids until 2 hours before arrival time  (Until 7:10 am)    Examples of clear liquids:  Water  Clear broth  Juices (apple, white grape, white cranberry  and cider) without pulp  Noncarbonated, powder based beverages  (lemonade and Nikhil-Aid)  Sodas (Sprite, 7-Up, ginger ale and seltzer)  Coffee or tea (without milk or cream)  Gatorade    --No alcohol or cannabis products for at least 24 hours before surgery    Which medicines can I take?    Hold Aspirin for 7 days before surgery.   Hold Multivitamins for 7 days before surgery.  Hold Supplements for 7 days before surgery.  Hold Ibuprofen (Advil, Motrin) for 1 day before surgery--unless otherwise directed by surgeon.  Hold Naproxen (Aleve) for 4 days before surgery.    -  DO NOT take the following medications the day of surgery:  Multivitamin - stop 7 days before surgery    -  PLEASE TAKE the following medications the night before or the day of surgery per your usual routine:   Finasteride (Proscar)    How do I prepare myself?  - Please take 2 showers (one the night prior to surgery and one the morning of surgery) using Scrubcare or Hibiclens soap.    Use this soap only from the neck to your toes.     Leave the soap on your skin for one minute--then rinse  thoroughly.      You may use your own shampoo and conditioner; no other hair products.   - Please remove all jewelry and body piercings.  - No lotions, deodorants or fragrance.  - No makeup or fingernail polish.   - Bring your ID and insurance card.    -If you have a Deep Brain Stimulator, a Spinal Cord Stimulator or any implanted Neuro device you must bring the remote to the Surgery Center          ALL PATIENTS ARE REQUIRED TO HAVE A RESPONSIBLE ADULT TO DRIVE AND BE IN ATTENDANCE WITH THEM FOR 24 HOURS FOLLOWING SURGERY       Covid testing policy as of 12/06/2022  Your surgeon will notify and schedule you for a COVID test if one is needed before surgery--please direct any questions or COVID symptoms to your surgeon      Questions or Concerns:    -For questions regarding the day of surgery please contact the Ambulatory Surgery Center at [phone].    -If you have health changes between today and your surgery please contact your surgeon.     For questions after surgery please call your surgeons office.

## 2023-09-05 ENCOUNTER — ANESTHESIA (OUTPATIENT)
Dept: SURGERY | Facility: AMBULATORY SURGERY CENTER | Age: 23
End: 2023-09-05
Payer: COMMERCIAL

## 2023-09-05 ENCOUNTER — HOSPITAL ENCOUNTER (OUTPATIENT)
Facility: AMBULATORY SURGERY CENTER | Age: 23
Discharge: HOME | End: 2023-09-05
Attending: OPHTHALMOLOGY
Payer: COMMERCIAL

## 2023-09-05 VITALS
TEMPERATURE: 97.1 F | SYSTOLIC BLOOD PRESSURE: 133 MMHG | BODY MASS INDEX: 27.85 KG/M2 | HEIGHT: 69 IN | HEART RATE: 66 BPM | RESPIRATION RATE: 16 BRPM | DIASTOLIC BLOOD PRESSURE: 73 MMHG | WEIGHT: 188 LBS | OXYGEN SATURATION: 100 %

## 2023-09-05 DIAGNOSIS — Z48.810 AFTERCARE FOLLOWING SURGERY OF A SENSORY ORGAN: Primary | ICD-10-CM

## 2023-09-05 PROCEDURE — 67121 REMOVE EYE IMPLANT MATERIAL: CPT | Mod: RT | Performed by: OPHTHALMOLOGY

## 2023-09-05 PROCEDURE — 67121 REMOVE EYE IMPLANT MATERIAL: CPT | Mod: RT

## 2023-09-05 RX ORDER — ONDANSETRON 4 MG/1
4 TABLET, ORALLY DISINTEGRATING ORAL EVERY 30 MIN PRN
Status: DISCONTINUED | OUTPATIENT
Start: 2023-09-05 | End: 2023-09-06 | Stop reason: HOSPADM

## 2023-09-05 RX ORDER — OXYCODONE HYDROCHLORIDE 5 MG/1
5 TABLET ORAL
Status: DISCONTINUED | OUTPATIENT
Start: 2023-09-05 | End: 2023-09-06 | Stop reason: HOSPADM

## 2023-09-05 RX ORDER — BALANCED SALT SOLUTION 6.4; .75; .48; .3; 3.9; 1.7 MG/ML; MG/ML; MG/ML; MG/ML; MG/ML; MG/ML
SOLUTION OPHTHALMIC PRN
Status: DISCONTINUED | OUTPATIENT
Start: 2023-09-05 | End: 2023-09-05 | Stop reason: HOSPADM

## 2023-09-05 RX ORDER — HYDROMORPHONE HYDROCHLORIDE 1 MG/ML
0.2 INJECTION, SOLUTION INTRAMUSCULAR; INTRAVENOUS; SUBCUTANEOUS EVERY 5 MIN PRN
Status: DISCONTINUED | OUTPATIENT
Start: 2023-09-05 | End: 2023-09-06 | Stop reason: HOSPADM

## 2023-09-05 RX ORDER — ONDANSETRON 2 MG/ML
4 INJECTION INTRAMUSCULAR; INTRAVENOUS EVERY 30 MIN PRN
Status: DISCONTINUED | OUTPATIENT
Start: 2023-09-05 | End: 2023-09-06 | Stop reason: HOSPADM

## 2023-09-05 RX ORDER — OXYCODONE HYDROCHLORIDE 5 MG/1
10 TABLET ORAL
Status: DISCONTINUED | OUTPATIENT
Start: 2023-09-05 | End: 2023-09-06 | Stop reason: HOSPADM

## 2023-09-05 RX ORDER — DEXAMETHASONE SODIUM PHOSPHATE 4 MG/ML
INJECTION, SOLUTION INTRA-ARTICULAR; INTRALESIONAL; INTRAMUSCULAR; INTRAVENOUS; SOFT TISSUE PRN
Status: DISCONTINUED | OUTPATIENT
Start: 2023-09-05 | End: 2023-09-05 | Stop reason: HOSPADM

## 2023-09-05 RX ORDER — PROPARACAINE HYDROCHLORIDE 5 MG/ML
1 SOLUTION/ DROPS OPHTHALMIC ONCE
Status: COMPLETED | OUTPATIENT
Start: 2023-09-05 | End: 2023-09-05

## 2023-09-05 RX ORDER — FENTANYL CITRATE 50 UG/ML
50 INJECTION, SOLUTION INTRAMUSCULAR; INTRAVENOUS EVERY 5 MIN PRN
Status: DISCONTINUED | OUTPATIENT
Start: 2023-09-05 | End: 2023-09-06 | Stop reason: HOSPADM

## 2023-09-05 RX ORDER — SODIUM CHLORIDE, SODIUM LACTATE, POTASSIUM CHLORIDE, CALCIUM CHLORIDE 600; 310; 30; 20 MG/100ML; MG/100ML; MG/100ML; MG/100ML
INJECTION, SOLUTION INTRAVENOUS CONTINUOUS
Status: DISCONTINUED | OUTPATIENT
Start: 2023-09-05 | End: 2023-09-06 | Stop reason: HOSPADM

## 2023-09-05 RX ORDER — CYCLOPENTOLAT/TROPIC/PHENYLEPH 1%-1%-2.5%
1 DROPS (EA) OPHTHALMIC (EYE)
Status: COMPLETED | OUTPATIENT
Start: 2023-09-05 | End: 2023-09-05

## 2023-09-05 RX ORDER — TETRACAINE HYDROCHLORIDE 5 MG/ML
SOLUTION OPHTHALMIC PRN
Status: DISCONTINUED | OUTPATIENT
Start: 2023-09-05 | End: 2023-09-05 | Stop reason: HOSPADM

## 2023-09-05 RX ORDER — HYDROMORPHONE HYDROCHLORIDE 1 MG/ML
0.4 INJECTION, SOLUTION INTRAMUSCULAR; INTRAVENOUS; SUBCUTANEOUS EVERY 5 MIN PRN
Status: DISCONTINUED | OUTPATIENT
Start: 2023-09-05 | End: 2023-09-06 | Stop reason: HOSPADM

## 2023-09-05 RX ORDER — LIDOCAINE 40 MG/G
CREAM TOPICAL
Status: DISCONTINUED | OUTPATIENT
Start: 2023-09-05 | End: 2023-09-06 | Stop reason: HOSPADM

## 2023-09-05 RX ORDER — ACETAMINOPHEN 325 MG/1
975 TABLET ORAL ONCE
Status: COMPLETED | OUTPATIENT
Start: 2023-09-05 | End: 2023-09-05

## 2023-09-05 RX ORDER — FENTANYL CITRATE 50 UG/ML
25 INJECTION, SOLUTION INTRAMUSCULAR; INTRAVENOUS EVERY 5 MIN PRN
Status: DISCONTINUED | OUTPATIENT
Start: 2023-09-05 | End: 2023-09-06 | Stop reason: HOSPADM

## 2023-09-05 RX ORDER — LIDOCAINE HYDROCHLORIDE 20 MG/ML
INJECTION, SOLUTION INFILTRATION; PERINEURAL PRN
Status: DISCONTINUED | OUTPATIENT
Start: 2023-09-05 | End: 2023-09-05

## 2023-09-05 RX ORDER — PROPOFOL 10 MG/ML
INJECTION, EMULSION INTRAVENOUS PRN
Status: DISCONTINUED | OUTPATIENT
Start: 2023-09-05 | End: 2023-09-05

## 2023-09-05 RX ADMIN — PROPOFOL 70 MG: 10 INJECTION, EMULSION INTRAVENOUS at 09:22

## 2023-09-05 RX ADMIN — SODIUM CHLORIDE, SODIUM LACTATE, POTASSIUM CHLORIDE, CALCIUM CHLORIDE: 600; 310; 30; 20 INJECTION, SOLUTION INTRAVENOUS at 08:04

## 2023-09-05 RX ADMIN — Medication 1 DROP: at 08:03

## 2023-09-05 RX ADMIN — PROPARACAINE HYDROCHLORIDE 1 DROP: 5 SOLUTION/ DROPS OPHTHALMIC at 07:55

## 2023-09-05 RX ADMIN — ACETAMINOPHEN 975 MG: 325 TABLET ORAL at 07:54

## 2023-09-05 RX ADMIN — LIDOCAINE HYDROCHLORIDE 60 MG: 20 INJECTION, SOLUTION INFILTRATION; PERINEURAL at 09:22

## 2023-09-05 RX ADMIN — Medication 1 DROP: at 08:07

## 2023-09-05 RX ADMIN — Medication 1 DROP: at 07:55

## 2023-09-05 NOTE — DISCHARGE INSTRUCTIONS
[facility]  Home Care Following Anesthesia  For 24 hours after surgery:  Get plenty of rest.  A responsible adult must stay with you for at least 24 hours after you leave the surgery center.  Do not drive or use heavy equipment.  If you have weakness or tingling, don't drive or use heavy equipment until this feeling goes away.   Do not drink alcohol.   Avoid strenuous or risky activities.  Ask for help when climbing stairs.  You may feel lightheaded.  IF so, sit for a few minutes before standing.  Have someone help you get up.   If you have nausea (feel sick to your stomach): Drink only clear liquids such as apple juice, ginger ale, broth or 7-Up.  Rest may also help.  Be sure to drink enough fluids.  Move to a regular diet as you feel able.   You may have a slight fever.  Call the doctor if your fever is over 100 F (37.7 C) (taken under the tongue) or lasts longer than 24 hours.  You may have a dry mouth, a sore throat, muscle aches or trouble sleeping. These should go away after 24 hours.  Do not make important or legal decisions.   It is recommended to avoid smoking.               Tips for taking pain medications  To get the best pain relief possible, remember these points:  Take pain medications as directed, before pain becomes severe.  Pain medication can upset your stomach: taking it with food may help.  Constipation is a common side effect of pain medication. Drink plenty of  fluids.  Eat foods high in fiber. Take a stool softener if recommended by your doctor or pharmacist.  Do not drink alcohol, drive or operate machinery while taking pain medications.  Ask about other ways to control pain, such as with heat, ice or relaxation.    Tylenol/Acetaminophen Consumption    If you feel your pain relief is insufficient, you may take Tylenol/Acetaminophen in addition to your narcotic pain medication.   Be careful not to exceed 4,000 mg of Tylenol/Acetaminophen in a 24 hour  period from all sources.  If you are taking extra strength Tylenol/acetaminophen (500 mg), the maximum dose is 8 tablets in 24 hours.  If you are taking regular strength acetaminophen (325 mg), the maximum dose is 12 tablets in 24 hours.  You received 975 mg of Tylenol at 7:54 AM today. Next dose is available at 1:54 PM as needed per package instruction.    Call a doctor for any of the following:  Signs of infection (fever, growing tenderness at the surgery site, a large amount of drainage or bleeding, severe pain, foul-smelling drainage, redness, swelling).  It has been over 8 to 10 hours since surgery and you are still not able to urinate (pass water).  Headache for over 24 hours.  Numbness, tingling or weakness the day after surgery (if you had spinal anesthesia).  Signs of Covid-19 infection (temperature over 100 degrees, shortness of breath, cough, loss of taste/smell, generalized body aches, persistent headache, chills, sore throat, nausea/vomiting/diarrhea)  Your doctor is:       Dr. Marine Lizarraga, Ophthalmology: [phone]               Or dial [phone] and ask for the resident on call for:  Ophthalmology  For emergency care, call the:  [facility]:  [phone] (TTY for hearing impaired: [phone])

## 2023-09-05 NOTE — ANESTHESIA PREPROCEDURE EVALUATION
Anesthesia Pre-Procedure Evaluation    Patient: Jalil Hernandez   MRN: [id number] : 2000        Procedure : Procedure(s):  Anterior chamber washout right eye          Past Medical History:   Diagnosis Date    Band-shaped keratopathy     RE    Glaucoma suspect     BE    Iritis     RE    Myopia     BE    RETINAL DETACHMENT NEC     BE    Staphyloma posticum     RE    Stickler's syndrome     Vitamin D deficiency       Past Surgical History:   Procedure Laterality Date    EXAM UNDER ANESTHESIA, LASER DIODE RETINA, COMBINED  4/15/2008    laser retinopexy LE    RECONSTRUCT ANTERIOR CHAMBER Right 2018    Procedure: Right Eye Anterior Chamber Wash Out and Lens Fragment Removal;  Surgeon: Tutu Almazan MD;  Location: [facility] OR    RETINAL REATTACHMENT      SCLERAL BUCKLE  1/15/2014    LE    STRABISMUS SURGERY  2009    Bilateral medial rectus recession, bilateral inferior oblique myectomy    VITRECTOMY PARSPLANA  2005    PPV, EL, SO RE    VITRECTOMY PARSPLANA  2005    PPV, PPL, SO removal RE    VITRECTOMY PARSPLANA  3/17/2010    PPV, EL, MP, PI RE    VITRECTOMY PARSPLANA  2010    PPV, EL, SO removal RE      No Known Allergies   Social History     Tobacco Use    Smoking status: Never    Smokeless tobacco: Never    Tobacco comments:     No one in family smokes.   Substance Use Topics    Alcohol use: No      Wt Readings from Last 1 Encounters:   23 85.3 kg (188 lb)        Anesthesia Evaluation            ROS/MED HX  ENT/Pulmonary:  - neg pulmonary ROS     Neurologic:  - neg neurologic ROS     Cardiovascular:  - neg cardiovascular ROS     METS/Exercise Tolerance: >4 METS    Hematologic:  - neg hematologic  ROS     Musculoskeletal:  - neg musculoskeletal ROS     GI/Hepatic:  - neg GI/hepatic ROS     Renal/Genitourinary:  - neg Renal ROS     Endo:  - neg endo ROS     Psychiatric/Substance Use:  - neg psychiatric ROS     Infectious Disease:  - neg infectious disease ROS     Malignancy:  -  neg malignancy ROS     Other:  - neg other ROS          Physical Exam    Airway  airway exam normal      Mallampati: I   TM distance: > 3 FB   Neck ROM: full   Mouth opening: > 3 cm    Respiratory Devices and Support         Dental       (+) Completely normal teeth      Cardiovascular          Rhythm and rate: regular and normal     Pulmonary           breath sounds clear to auscultation           OUTSIDE LABS:  CBC:   Lab Results   Component Value Date    WBC 7.1 05/31/2022    WBC 3.6 (L) 10/05/2021    HGB 14.7 05/31/2022    HGB 14.5 10/05/2021    HCT 44.5 05/31/2022    HCT 42.1 10/05/2021     05/31/2022     10/05/2021     BMP:   Lab Results   Component Value Date     05/31/2022     10/05/2021    POTASSIUM 3.7 05/31/2022    POTASSIUM 4.5 10/05/2021    CHLORIDE 105 05/31/2022    CHLORIDE 105 10/05/2021    CO2 28 05/31/2022    CO2 28 10/05/2021    BUN 13 05/31/2022    BUN 9 10/05/2021    CR 0.75 05/31/2022    CR 0.90 10/05/2021    GLC 90 05/31/2022    GLC 88 10/05/2021     COAGS:   Lab Results   Component Value Date    PTT 30 04/26/2012    INR 1.18 (H) 04/26/2012     POC:   Lab Results   Component Value Date    BGM 79 12/27/2016     HEPATIC:   Lab Results   Component Value Date    ALBUMIN 4.1 10/05/2021    PROTTOTAL 8.1 10/05/2021    ALT 17 10/05/2021    AST 15 10/05/2021    ALKPHOS 89 10/05/2021    BILITOTAL 0.5 10/05/2021     OTHER:   Lab Results   Component Value Date    JONATHAN 9.4 05/31/2022    TSH 2.13 10/05/2021    T4 1.17 11/19/2008    T3 128 11/19/2008    CRP <2.9 08/31/2016    SED 13 08/31/2016       Anesthesia Plan    ASA Status:  1    NPO Status:  NPO Appropriate    Anesthesia Type: MAC.              Consents    Anesthesia Plan(s) and associated risks, benefits, and realistic alternatives discussed. Questions answered and patient/representative(s) expressed understanding.     - Discussed: Risks, Benefits and Alternatives for BOTH SEDATION and the PROCEDURE were discussed     -  Discussed with:  Patient            Postoperative Care            Comments:                Travon Dorado MD

## 2023-09-05 NOTE — ANESTHESIA CARE TRANSFER NOTE
Patient: Jalil Hernandez    Procedure: Procedure(s):  Anterior chamber washout right eye       Diagnosis: Retained intraocular foreign body in anterior chamber of right eye [H44.711]  Diagnosis Additional Information: No value filed.    Anesthesia Type:   MAC     Note:    Oropharynx: oropharynx clear of all foreign objects and spontaneously breathing  Level of Consciousness: awake  Oxygen Supplementation: room air    Independent Airway: airway patency satisfactory and stable  Dentition: dentition unchanged  Vital Signs Stable: post-procedure vital signs reviewed and stable  Report to RN Given: handoff report given  Patient transferred to: Phase II  Comments: Vital signs per nursing documentation.       Handoff Report: Identifed the Patient, Identified the Reponsible Provider, Reviewed the pertinent medical history, Discussed the surgical course, Reviewed Intra-OP anesthesia mangement and issues during anesthesia, Set expectations for post-procedure period and Allowed opportunity for questions and acknowledgement of understanding      Vitals:  Vitals Value Taken Time   BP     Temp     Pulse 63    Resp 13    SpO2 100%        Electronically Signed By: MATTHEW Echevarria CRNA  September 5, 2023  9:45 AM

## 2023-09-05 NOTE — OP NOTE
SURGEON:  BENOIT PAULINO   PREOPERATIVE DIAGNOSIS:  retained Silicone Oil in Anterior chamber right eye   POSTOPERATIVE DIAGNOSIS: same  NAME OF THE PROCEDURE:  anterior chamber wash out; removal of Silicone Oil right eye   ANESTHESIA: sedation and peribulbar block   COMPLICATIONS: none  INDICATIONS: Jalil Hernandez is a 23 year old with diagnosis of  retained Silicone Oil in Anterior chamber     DESCRIPTION OF THE PROCEDURE:  The patient was taken to the operating room where sedation was administered by the anesthesia department and a retrobulbar block consistent of 1%lidocaine, 0.75% of bupivacaine with epinephrine and widase was administered to the right eye   The eye was prep and drape in the usual sterile fashion for ophthalmic surgery including the installation of 5% povidone iodine. A sterile drape and a lid speculum were placed over the right eye.  A paracenthesis was created nasally and a Lewicky cannula was placed. The infusion was turned on.  A second paracenthesis was created temporally and a second Lewicky was placed to aspirate the Silicone Oil bubble. A Large Silicone Oil bubble was aspirated.  The Lewicky cannulas were removed. The paracenthesis wounds were hydrated with with Balanced Salt Solution attached to a cannula.  subconjunctival injections of ancef and dexamethasone were administered.  The lid speculum was removed, the eye was clean with wet and dry gauze. Maxitrol oint was put in the eye. An eye patch and a south shield were placed over the eye,  The patient was transport to the postoperative care unit having tolerating the procedure well.

## 2023-09-06 ENCOUNTER — OFFICE VISIT (OUTPATIENT)
Dept: OPHTHALMOLOGY | Facility: CLINIC | Age: 23
End: 2023-09-06
Attending: OPHTHALMOLOGY
Payer: COMMERCIAL

## 2023-09-06 DIAGNOSIS — Z48.810 AFTERCARE FOLLOWING SURGERY OF A SENSORY ORGAN: Primary | ICD-10-CM

## 2023-09-06 PROCEDURE — 99024 POSTOP FOLLOW-UP VISIT: CPT | Mod: GC | Performed by: OPHTHALMOLOGY

## 2023-09-06 PROCEDURE — G0463 HOSPITAL OUTPT CLINIC VISIT: HCPCS | Performed by: OPHTHALMOLOGY

## 2023-09-06 RX ORDER — OFLOXACIN 3 MG/ML
1-2 SOLUTION/ DROPS OPHTHALMIC 4 TIMES DAILY
Qty: 5 ML | Refills: 0 | Status: CANCELLED | OUTPATIENT
Start: 2023-09-06

## 2023-09-06 RX ORDER — PREDNISOLONE ACETATE 10 MG/ML
1-2 SUSPENSION/ DROPS OPHTHALMIC 4 TIMES DAILY
Qty: 5 ML | Refills: 1 | Status: CANCELLED | OUTPATIENT
Start: 2023-09-06

## 2023-09-06 ASSESSMENT — REFRACTION_WEARINGRX
OD_CYLINDER: +0.75
OS_CYLINDER: +0.50
OS_AXIS: 030
OS_SPHERE: -11.00
SPECS_TYPE: SVL
OD_SPHERE: -3.00
OD_AXIS: 110

## 2023-09-06 ASSESSMENT — SLIT LAMP EXAM - LIDS: COMMENTS: INFERIOR TEMPORAL PROMINENCE OF THE SCLERAL BUCKLE. NO EXPOSURE, BUT POSSIBLE SLIGHTLY DISPLACEMENT

## 2023-09-06 ASSESSMENT — TONOMETRY
IOP_METHOD: TONOPEN
OS_IOP_MMHG: 17
OD_IOP_MMHG: 17

## 2023-09-06 ASSESSMENT — VISUAL ACUITY
OD_PH_CC: 20/60
OD_CC: 20/70
OS_CC+: +2
OS_CC: 20/50
OD_PH_CC+: -1
CORRECTION_TYPE: GLASSES
METHOD: SNELLEN - LINEAR

## 2023-09-06 ASSESSMENT — EXTERNAL EXAM - LEFT EYE: OS_EXAM: NORMAL

## 2023-09-06 ASSESSMENT — EXTERNAL EXAM - RIGHT EYE: OD_EXAM: NORMAL

## 2023-09-06 ASSESSMENT — CUP TO DISC RATIO
OS_RATIO: 0.3
OD_RATIO: 0.2

## 2023-09-06 NOTE — PATIENT INSTRUCTIONS
"Activity:   - avoid strenuous activity for 2 weeks - nothing more rigorous than a moderate walk  - eye shield over your eye at night 1 week      What to watch out for:  If you experience any of the following \"RSVP Symptoms\", you should call immediately:  Worsening Redness  Worsening Sensitivity to light  Worsening Vision, including new flashing lights or floaters  Worsening Pain, including nausea/vomiting      For any of the symptoms listed above, or for other concerns, call [phone] and ask to speak to the clinic nurse.  If you call after hours, follow to prompts to reach the doctor on call.      Follow up:  - 1 week  "

## 2023-09-06 NOTE — ANESTHESIA POSTPROCEDURE EVALUATION
Patient: Jalil Hernandez    Procedure: Procedure(s):  Anterior chamber washout right eye       Anesthesia Type:  MAC    Note:  Disposition: Outpatient   Postop Pain Control: Uneventful            Sign Out: Well controlled pain   PONV: No   Neuro/Psych: Uneventful            Sign Out: Acceptable/Baseline neuro status   Airway/Respiratory: Uneventful            Sign Out: Acceptable/Baseline resp. status   CV/Hemodynamics: Uneventful            Sign Out: Acceptable CV status; No obvious hypovolemia; No obvious fluid overload   Other NRE: NONE   DID A NON-ROUTINE EVENT OCCUR? No           Last vitals:  Vitals Value Taken Time   /73 09/05/23 1015   Temp 36.2  C (97.1  F) 09/05/23 1015   Pulse 66 09/05/23 1015   Resp 16 09/05/23 1015   SpO2 100 % 09/05/23 1015       Electronically Signed By: Travon Dorado MD  September 6, 2023  1:13 PM

## 2023-09-06 NOTE — PROGRESS NOTES
CC -   Follow up stickler's s  HPI -  Jalil Hernandez is a  20 year old year-old patient with h/o Stickler's syndrome, with h/o multiple RD repair OD, laser pexy OS, and also RD OS s/p repair with SB.  Seen in past at [facility] by Antonio Pascal, Klesert, and Carlos A, now following with Kirsty Almazan and Elham.    s/p AC washout  OD for retained Sommering ring (12/19/18) by Dr. Almazan. He was diagnosed with uveitis in the right eye, now stable.  Status post Macula hole repair left eyeat [place]    INTERVAL HISTORY - No new flashes/floaters/photosensitivity. Underwent AC washout 9/5/23     Invitae testing: One Pathogenic variant and one Variant of Uncertain Significance identified in COL2A1. COL2A1 is associated with a spectrum of autosomal dominant and recessive conditions affecting the skeletal system.    PAST OCULAR SURGERY  AC washout OD 12/19/18 by Dr. Almazan  RD repair OD (multiple, last PPV/SO and SOR) 2005 & 2010 (Klesert)  SBP OS 2014 (PAL)  CE/IOL OD 2005 (Naida)    Ophtho Meds: None  Patient on pre-exams for nurse career    OCULAR IMAGING    ECC 08/21/23  Right eye: 1575 CD, 573 pachy  Left eye: 2402 CD, 552 pachmetry    OCT mac 08/21/23   right eye- retina thinning. vitreous traction,  retinoschisis, subfoveal fluid  left eye- Macula hole closed. Retina thinning    OVF 08/21/23   LVF right eye: peripheral decreased sensitivity- does not appear glaucoma. Patient with descentered intraocular lens possibly causing visual field defects. FP 3/12, MD -10.3 > -7.3  OVF24-2 left eye: enlarged blind spot, scattered central defects, MD -4.9 > 05.4    ONFL 08/21/23   Right eye: No segmentation data available, grossly similar nerve thickness but increased tractional component compared to 5/2021  Left eye: possible supratemporal thinning although segmentation inconsistent between scans    PHOTOS optos 02/22/23  consistent with exam     ASSESSMENT & PLAN    #Postoperative eye state   -s/p AC washout, retained silicone oil removed, loose  fragment removed, but residual fragment imbedded in iris inferiorly and was left in place 12.2018   -s/p AC washout with SOR right eye 9/5/23    -Doing well, has follow up with cornea  Drops: (right Eye)  Color  Frequency  -Maxitrol oint.    White  3x/day    # history of Stickler's syndrome  - reviewed RT/RD precautions  - genetic report: 03/03/22 COL2A1 is associated with a spectrum of autosomal dominant and recessive conditions affecting the skeletal system.  Retina attached     # history of full thickness macula hole, left eye  Status post repair at [place] 8.13.2021 Dr. Godwin  - noted on exam (4-14-21) unclear onset.   Macula hole closed     # History of RD repair OD with residual TRD   - last surgery 2010 with PPV/SO and SO removal   - small residual superior peripheral TRD noted by JT in 2014, stable    # History of RD repair OS   - s/p prophlyactic pexy (Klesert)   - s/p SBP 2014 (DIXIET)   - status post additional laser treatment 9.12.18 and 9.25.18   - scleral buckle displaced anteriorly, not exposed. Noted since 9.2018. Per patient, no eye pain   -  no new tears, everything behind laser barricade.     # ERM OD with retinal traction   - may be residual posterior hyaloid- stable    - OCT shows some VRT with CME temporal mid-periphery   - OCT shows some SO under Epiretinal membrane    # History of Anterior uveitis, right eye   - likely was 2/2 retained of Sommering ring in anterior chamber causing inflammation   - s/p AC washout, retained silicone oil removed, loose fragment removed, but residual fragment imbedded in iris inferiorly and was left in place 12.2018   - no corneal edema; no activity   - discussed return precautions, restrictions    #Cataract left eye  Posterior subcapsular cataract (PSC)    -Monitor, operate when visually significant. Patient happy with his vision    # Phacodonesis right eye, lens slightly  dislocated superiorly   - may need lens exchange if worsens, monitor for now     #  History  of ocular hypertension   - ONFL 08/21/23 possible supratemporal thinning left eye although segmentation inconsistent between scans   - IOP 14/13   - currently off eyedrops   - optic nerve with small cups; retina thinning  -OVF each eye 08/21/23 stable from prior    In the future Follow up 6 months with OCT mac/RNFL each eye and VTD  - if worsening will consider starting glaucoma meds     Retina detachment precautions were discussed with the patient (presence or increased in flashes, floaters or a curtain in the visual field) and was asked to return if any of the those occur   warm compresses and artificial tears      Thank you for entrusting us with your care  Jadyn Al MD, PGY3  Ophthalmology Resident  [facility]    ~~~~~~~~~~~~~~~~~~~~~~~~~~~~~~~~~~   Complete documentation of historical and exam elements from today's encounter can be found in the full encounter summary report (not reduplicated in this progress note).  I personally obtained the chief complaint(s) and history of present illness.  I confirmed and edited as necessary the review of systems, past medical/surgical history, family history, social history, and examination findings as documented by others; and I examined the patient myself.  I personally reviewed the relevant tests, images, and reports as documented above.  I personally reviewed the ophthalmic test(s) associated with this encounter, agree with the interpretation(s) as documented by the resident/fellow, and have edited the corresponding report(s) as necessary.   I formulated and edited as necessary the assessment and plan and discussed the findings and management plan with the patient and family    Marine Lizarraga MD   of Ophthalmology.  Retina Service   Department of Ophthalmology and Visual Neurosciences   [facility]  Phone: [phone]   Fax: [phone]

## 2023-09-06 NOTE — NURSING NOTE
Chief Complaints and History of Present Illnesses   Patient presents with    Post Op (Ophthalmology) Right Eye     Chief Complaint(s) and History of Present Illness(es)       Post Op (Ophthalmology) Right Eye              Laterality: right eye    Onset: 1 day ago              Comments    1 day s/p Anterior chamber washout right eye-Pt. States that  he is doing well. No pain BE. Was able to sleep well.   Lor Vemra COT 7:26 AM September 6, 2023

## 2023-09-11 ENCOUNTER — TELEPHONE (OUTPATIENT)
Dept: OPHTHALMOLOGY | Facility: CLINIC | Age: 23
End: 2023-09-11
Payer: COMMERCIAL

## 2023-09-11 NOTE — LETTER
September 12, 2023      RE: Jalil Hernandez  [address]  [place] [address]       To whom it may concern:    Jalil Hernandez underwent an anterior chamber washout 9/5/23. It is recommended that he not have any heavy lifting or strenuous exercise for 2 weeks following. He should be excused from any light work (eg: computer work) for 3-4 days following surgery for recovery.    Sincerely,        Jadyn Al MD, PGY3  Ophthalmology Resident  [facility]

## 2023-09-11 NOTE — TELEPHONE ENCOUNTER
[facility] Call Center    Phone Message    May a detailed message be left on voicemail: yes     Reason for Call: Form or Letter   Type or form/letter needing completion: Pt is requesting a doctors note with surgery information, recovery time, and his current restrictions that may a effect his employment. States he job is requesting the information  Provider:   Date form needed: asap  Once completed: Fax form to: Nava Diaz Fax Number:[phone]    Action Taken: Message routed to:  [facility] eye    Travel Screening: Not Applicable

## 2023-09-14 ENCOUNTER — OFFICE VISIT (OUTPATIENT)
Dept: OPHTHALMOLOGY | Facility: CLINIC | Age: 23
End: 2023-09-14
Attending: OPHTHALMOLOGY
Payer: COMMERCIAL

## 2023-09-14 DIAGNOSIS — Z48.810 AFTERCARE FOLLOWING SURGERY OF A SENSORY ORGAN: Primary | ICD-10-CM

## 2023-09-14 PROCEDURE — 99024 POSTOP FOLLOW-UP VISIT: CPT | Performed by: OPHTHALMOLOGY

## 2023-09-14 PROCEDURE — G0463 HOSPITAL OUTPT CLINIC VISIT: HCPCS | Performed by: OPHTHALMOLOGY

## 2023-09-14 RX ORDER — NEOMYCIN SULFATE, POLYMYXIN B SULFATE, AND DEXAMETHASONE 3.5; 10000; 1 MG/G; [USP'U]/G; MG/G
0.5 OINTMENT OPHTHALMIC 4 TIMES DAILY
COMMUNITY

## 2023-09-14 ASSESSMENT — REFRACTION_WEARINGRX
OS_CYLINDER: +0.50
OD_CYLINDER: +0.75
SPECS_TYPE: SVL
OD_SPHERE: -3.00
OS_AXIS: 030
OD_AXIS: 110
OS_SPHERE: -11.00

## 2023-09-14 ASSESSMENT — VISUAL ACUITY
OS_CC: 20/60
METHOD: SNELLEN - LINEAR
OD_CC+: -1
OS_PH_CC+: -2
OD_CC: 20/70
CORRECTION_TYPE: GLASSES
OS_CC+: +2
OS_PH_CC: 20/40

## 2023-09-14 ASSESSMENT — SLIT LAMP EXAM - LIDS: COMMENTS: INFERIOR TEMPORAL PROMINENCE OF THE SCLERAL BUCKLE. NO EXPOSURE, BUT POSSIBLE SLIGHTLY DISPLACEMENT

## 2023-09-14 ASSESSMENT — CUP TO DISC RATIO
OD_RATIO: 0.2
OS_RATIO: 0.3

## 2023-09-14 ASSESSMENT — TONOMETRY
OS_IOP_MMHG: 12
OD_IOP_MMHG: 13
IOP_METHOD: TONOPEN

## 2023-09-14 ASSESSMENT — EXTERNAL EXAM - LEFT EYE: OS_EXAM: NORMAL

## 2023-09-14 ASSESSMENT — EXTERNAL EXAM - RIGHT EYE: OD_EXAM: NORMAL

## 2023-09-14 NOTE — PROGRESS NOTES
CC -  status post Anterior chamber wash out to remove Silicone Oil bubble 9.5.23  Interim: no eye pain; no flashes and floaters    Follow up stickler's s  HPI -  Jalil Hernandez is a  20 year old year-old patient with h/o Stickler's syndrome, with h/o multiple RD repair OD, laser pexy OS, and also RD OS s/p repair with SB.  Seen in past at [facility] by Antonio Pascal, Klesert, and Carlos A, now following with Kirsty Almazan and Elham.    s/p AC washout  OD for retained Sommering ring (12/19/18) by Dr. Almazan. He was diagnosed with uveitis in the right eye, now stable.  Status post Macula hole repair left eyeat [place]    Invitae testing: One Pathogenic variant and one Variant of Uncertain Significance identified in COL2A1. COL2A1 is associated with a spectrum of autosomal dominant and recessive conditions affecting the skeletal system.    PAST OCULAR SURGERY  AC washout OD 12/19/18 by Dr. Almazan  RD repair OD (multiple, last PPV/SO and SOR) 2005 & 2010 (Klesert)  SBP OS 2014 (PAL)  CE/IOL OD 2005 (Naida)    Ophtho Meds: None  Patient on pre-exams for nurse career    OCULAR IMAGING    ECC 08/21/23  Right eye: 1575 CD, 573 pachy  Left eye: 2402 CD, 552 pachmetry    OCT mac 08/21/23   right eye- retina thinning. vitreous traction,  retinoschisis, subfoveal fluid  left eye- Macula hole closed. Retina thinning    OVF 08/21/23   LVF right eye: peripheral decreased sensitivity- does not appear glaucoma. Patient with descentered intraocular lens possibly causing visual field defects. FP 3/12, MD -10.3 > -7.3  OVF24-2 left eye: enlarged blind spot, scattered central defects, MD -4.9 > 05.4    ONFL 08/21/23   Right eye: No segmentation data available, grossly similar nerve thickness but increased tractional component compared to 5/2021  Left eye: possible supratemporal thinning although segmentation inconsistent between scans    PHOTOS optos 02/22/23  consistent with exam     ASSESSMENT & PLAN    #Postoperative eye state   -s/p AC washout, retained  silicone oil removed, loose fragment removed, but residual fragment imbedded in iris inferiorly and was left in place 12.2018   -s/p AC washout with SOR right eye 9/5/23    -Doing well, has follow up with cornea  Drops: (right Eye)  Color  Frequency  -Maxitrol oint.    White  2x/day x 5 days then once a day till finsh    # history of Stickler's syndrome  - reviewed RT/RD precautions  - genetic report: 03/03/22 COL2A1 is associated with a spectrum of autosomal dominant and recessive conditions affecting the skeletal system.  Retina attached     # history of full thickness macula hole, left eye  Status post repair at [place] 8.13.2021 Dr. Godwin  - noted on exam (4-14-21) unclear onset.   Macula hole closed     # History of RD repair OD with residual TRD   - last surgery 2010 with PPV/SO and SO removal   - small residual superior peripheral TRD noted by JT in 2014, stable    # History of RD repair OS   - s/p prophlyactic pexy (Klemichaelle)   - s/p SBP 2014 (JT)   - status post additional laser treatment 9.12.18 and 9.25.18   - scleral buckle displaced anteriorly, not exposed. Noted since 9.2018. Per patient, no eye pain   -  no new tears, everything behind laser barricade.     # ERM OD with retinal traction   - may be residual posterior hyaloid- stable    - OCT shows some VRT with CME temporal mid-periphery   - OCT shows some SO under Epiretinal membrane    # History of Anterior uveitis, right eye   - likely was 2/2 retained of Sommering ring in anterior chamber causing inflammation   - s/p AC washout, retained silicone oil removed, loose fragment removed, but residual fragment imbedded in iris inferiorly and was left in place 12.2018   - no corneal edema; no activity   - discussed return precautions, restrictions    #Cataract left eye  Posterior subcapsular cataract (PSC)    -Monitor, operate when visually significant. Patient happy with his vision    # Phacodonesis right eye, lens slightly  dislocated superiorly   - may  need lens exchange if worsens, monitor for now     #  History of ocular hypertension   - ONFL 08/21/23 possible supratemporal thinning left eye although segmentation inconsistent between scans   - IOP 14/13   - currently off eyedrops   - optic nerve with small cups; retina thinning  -OVF each eye 08/21/23 stable from prior    In the future Follow up 6 months with OCT mac/RNFL each eye and VTD  - if worsening will consider starting glaucoma meds     Retina detachment precautions were discussed with the patient (presence or increased in flashes, floaters or a curtain in the visual field) and was asked to return if any of the those occur   warm compresses and artificial tears      ~~~~~~~~~~~~~~~~~~~~~~~~~~~~~~~~~~   Complete documentation of historical and exam elements from today's encounter can be found in the full encounter summary report (not reduplicated in this progress note).  I personally obtained the chief complaint(s) and history of present illness.  I confirmed and edited as necessary the review of systems, past medical/surgical history, family history, social history, and examination findings as documented by others; and I examined the patient myself.  I personally reviewed the relevant tests, images, and reports as documented above.  I formulated and edited as necessary the assessment and plan and discussed the findings and management plan with the patient and family    Marine Lizarraga MD   of Ophthalmology.  Retina Service   Department of Ophthalmology and Visual Neurosciences   [facility]  Phone: [phone]   Fax: [phone]

## 2023-09-14 NOTE — NURSING NOTE
Chief Complaints and History of Present Illnesses   Patient presents with    Post Op (Ophthalmology) Right Eye     Post Anterior chamber washout right eye on 09/05/2023     Chief Complaint(s) and History of Present Illness(es)       Post Op (Ophthalmology) Right Eye              Laterality: right eye    Course: stable    Associated symptoms: Negative for dryness, eye pain, flashes and floaters    Treatments tried: eye drops    Pain scale: 0/10    Comments: Post Anterior chamber washout right eye on 09/05/2023              Comments    He states that his vision has seemed stable in both eyes, since his last eye exam.  Patient denies having any eye discomfort.    Xiomara Markham, JB 7:25 AM  September 14, 2023

## 2023-09-25 ENCOUNTER — OFFICE VISIT (OUTPATIENT)
Dept: OPHTHALMOLOGY | Facility: CLINIC | Age: 23
End: 2023-09-25
Attending: OPHTHALMOLOGY
Payer: COMMERCIAL

## 2023-09-25 DIAGNOSIS — Q89.8 STICKLER SYNDROME: ICD-10-CM

## 2023-09-25 DIAGNOSIS — T85.29XD: ICD-10-CM

## 2023-09-25 DIAGNOSIS — H25.042 POSTERIOR SUBCAPSULAR POLAR AGE-RELATED CATARACT OF LEFT EYE: ICD-10-CM

## 2023-09-25 DIAGNOSIS — H35.342 MACULAR HOLE OF LEFT EYE: ICD-10-CM

## 2023-09-25 DIAGNOSIS — H18.421 BAND KERATOPATHY OF RIGHT EYE: Primary | ICD-10-CM

## 2023-09-25 PROCEDURE — 99215 OFFICE O/P EST HI 40 MIN: CPT | Performed by: OPHTHALMOLOGY

## 2023-09-25 PROCEDURE — G0463 HOSPITAL OUTPT CLINIC VISIT: HCPCS | Performed by: OPHTHALMOLOGY

## 2023-09-25 ASSESSMENT — CONF VISUAL FIELD
OD_SUPERIOR_NASAL_RESTRICTION: 0
OS_INFERIOR_TEMPORAL_RESTRICTION: 0
OD_NORMAL: 1
OD_SUPERIOR_TEMPORAL_RESTRICTION: 0
OS_NORMAL: 1
OS_SUPERIOR_TEMPORAL_RESTRICTION: 0
OD_INFERIOR_TEMPORAL_RESTRICTION: 0
OD_INFERIOR_NASAL_RESTRICTION: 0
OS_INFERIOR_NASAL_RESTRICTION: 0
OS_SUPERIOR_NASAL_RESTRICTION: 0

## 2023-09-25 ASSESSMENT — REFRACTION_WEARINGRX
OD_AXIS: 110
SPECS_TYPE: SVL
OS_AXIS: 030
OS_CYLINDER: +0.50
OD_SPHERE: -3.00
OD_CYLINDER: +0.75
OS_SPHERE: -11.00

## 2023-09-25 ASSESSMENT — VISUAL ACUITY
METHOD: SNELLEN - LINEAR
OD_CC: 20/70
OS_CC+: -2
OS_CC: 20/50
OD_CC+: +1
CORRECTION_TYPE: GLASSES

## 2023-09-25 ASSESSMENT — SLIT LAMP EXAM - LIDS: COMMENTS: INFERIOR TEMPORAL PROMINENCE OF THE SCLERAL BUCKLE. NO EXPOSURE, BUT POSSIBLE SLIGHTLY DISPLACEMENT

## 2023-09-25 ASSESSMENT — TONOMETRY
OD_IOP_MMHG: 15
IOP_METHOD: ICARE
OS_IOP_MMHG: 17

## 2023-09-25 ASSESSMENT — EXTERNAL EXAM - RIGHT EYE: OD_EXAM: NORMAL

## 2023-09-25 ASSESSMENT — EXTERNAL EXAM - LEFT EYE: OS_EXAM: NORMAL

## 2023-09-25 NOTE — NURSING NOTE
Chief Complaints and History of Present Illnesses   Patient presents with    Band Keratopathy Follow Up     Chief Complaint(s) and History of Present Illness(es)       Band Keratopathy Follow Up              Laterality: right eye              Comments    Pt. States that he is doing well. No change in VA BE. No pain BE. No flashes or new floaters BE.  Lor Verma COT 7:23 AM September 25, 2023

## 2023-09-25 NOTE — PROGRESS NOTES
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 Patient developed anterior uveitis 2/2 presumed chronic retained lens fragment in the inferior angle. This was removed with AC washout 2018, with residual tr fragments scarred to the inferior endothelium left. Corneal edema and uveitis resolved. Patient has mild pseudophakodonesis OD, but stable for several years.      Interval Hx:  Patient has been following with Dr. Lizarraga. No new changes since last visit. Patient denies change in vision. No pain, redness, tearing, discharge, or photophobia. No diplopia. No new flashes, floaters.    Past ocular history   AC washout OD 12/19/18 by Dr. Almazan  RD repair OD (multiple, last PPV/SO and SOR) 2005 & 2010 (Klesert)  SBP OS 2014 (JT)  CE/IOL OD 2005 (Bothun)  [facility]     Past Medical History:   Diagnosis Date    Band-shaped keratopathy     RE    Glaucoma suspect     BE    Iritis     RE    Myopia     BE    RETINAL DETACHMENT NEC     BE    Staphyloma posticum     RE    Stickler's syndrome     Vitamin D deficiency 2019       PSH     Past Surgical History:   Procedure Laterality Date    EXAM UNDER ANESTHESIA, LASER DIODE RETINA, COMBINED  4/15/2008    laser retinopexy LE    RECONSTRUCT ANTERIOR CHAMBER Right 12/18/2018    Procedure: Right Eye Anterior Chamber Wash Out and Lens Fragment Removal;  Surgeon: Tutu Almazan MD;  Location:  OR    RECONSTRUCT ANTERIOR CHAMBER Right 9/5/2023    Procedure: Anterior chamber washout right eye;  Surgeon: Marine Lizarraga MD;  Location: [facility] OR    RETINAL REATTACHMENT      SCLERAL BUCKLE  1/15/2014    LE    STRABISMUS SURGERY  8/13/2009    Bilateral medial rectus recession, bilateral inferior oblique myectomy    VITRECTOMY PARSPLANA   5/17/2005    PPV, EL, SO RE    VITRECTOMY PARSPLANA  12/27/2005    PPV, PPL, SO removal RE    VITRECTOMY PARSPLANA  3/17/2010    PPV, EL, MP, PI RE    VITRECTOMY PARSPLANA  8/11/2010    PPV, EL, SO removal RE       Meds     Current Outpatient Medications   Medication    clindamycin (CLEOCIN T) 1 % external lotion    finasteride (PROSCAR) 5 MG tablet    multivitamin w/minerals (MULTI-VITAMIN) tablet    neomycin-polymyxin-dexAMETHasone (MAXITROL) 3.5-32774-1.1 ophthalmic ointment     No current facility-administered medications for this visit.     Drops Currently Taking   Maxitrol daily OD    Assessment/Plan   # s/p AC washout for retained SO OD  - no significant K edema  - continue maxitrol ointment daily until empty    # Cataract OS  - not visually significant  - monitor    # Band keratopathy OD  - likely related to history of silicone oil  - not visually significant, monitor    # Pseudophakodonesis OD  - stable x 2 years, lens seems stable in the sulcus  - if the sommering's ring displaces into the visual axis - may be sufficient to YAG or perform PPVx to debulk it  - continue to monitor at this time    # Anterior uveitis OD  - quiet today on daily maxitrol  - monitor - likely 2/2 to previous chronic retained lens fragment      # Stickler syndrome s/p multiple RD with repair  - continue to follow-up with retina     Follow up:  1 year with Cornea w/ V,T, at next visit, call if problems sooner to clinic.    Follow up with other provider : Elham as scheduled      Attending Physician Attestation:  Complete documentation of historical and exam elements from today's encounter can be found in the full encounter summary report (not reduplicated in this progress note).  I personally obtained the chief complaint(s) and history of present illness.  I confirmed and edited as necessary the review of systems, past medical/surgical history, family history, social history, and examination findings as documented by others; and  I examined the patient myself.  I personally reviewed the relevant tests, images, and reports as documented above.  I formulated and edited as necessary the assessment and plan and discussed the findings and management plan with the patient and family. - Tutu Almazan MD    I personally spent great than 40minutes spent on the date of the encounter doing chart review, history and exam, discussion with the patient, documentation, and further activities as noted above. We discussed the complexity of their diagnosis, the need for further information, close monitoring, continued management, and the unknown prognosis for the patient at this time.    Tutu Almazan MD

## 2023-11-25 ENCOUNTER — HEALTH MAINTENANCE LETTER (OUTPATIENT)
Age: 23
End: 2023-11-25

## 2024-03-04 DIAGNOSIS — H40.003 GLAUCOMA SUSPECT OF BOTH EYES: Primary | ICD-10-CM

## 2024-03-13 ENCOUNTER — OFFICE VISIT (OUTPATIENT)
Dept: OPHTHALMOLOGY | Facility: CLINIC | Age: 24
End: 2024-03-13
Attending: OPHTHALMOLOGY
Payer: COMMERCIAL

## 2024-03-13 DIAGNOSIS — H40.003 GLAUCOMA SUSPECT OF BOTH EYES: ICD-10-CM

## 2024-03-13 PROCEDURE — 99214 OFFICE O/P EST MOD 30 MIN: CPT | Performed by: OPHTHALMOLOGY

## 2024-03-13 PROCEDURE — 99207 OCT OPTIC NERVE RNFL SPECTRALIS OU (BOTH EYES): CPT | Mod: 26 | Performed by: OPHTHALMOLOGY

## 2024-03-13 PROCEDURE — 92133 CPTRZD OPH DX IMG PST SGM ON: CPT | Performed by: OPHTHALMOLOGY

## 2024-03-13 PROCEDURE — 92134 CPTRZ OPH DX IMG PST SGM RTA: CPT | Performed by: OPHTHALMOLOGY

## 2024-03-13 PROCEDURE — G0463 HOSPITAL OUTPT CLINIC VISIT: HCPCS | Performed by: OPHTHALMOLOGY

## 2024-03-13 ASSESSMENT — EXTERNAL EXAM - LEFT EYE: OS_EXAM: NORMAL

## 2024-03-13 ASSESSMENT — TONOMETRY
OD_IOP_MMHG: 13
OS_IOP_MMHG: 14
IOP_METHOD: ICARE

## 2024-03-13 ASSESSMENT — VISUAL ACUITY
OS_CC+: -2
METHOD: SNELLEN - LINEAR
OS_PH_CC: 20/40
CORRECTION_TYPE: GLASSES
OS_CC: 20/60
OD_CC: 20/60
OD_CC+: -1

## 2024-03-13 ASSESSMENT — REFRACTION_WEARINGRX
SPECS_TYPE: SVL
OD_AXIS: 110
OD_SPHERE: -3.00
OD_CYLINDER: +0.75
OS_SPHERE: -11.00
OS_CYLINDER: +0.50
OS_AXIS: 030

## 2024-03-13 ASSESSMENT — EXTERNAL EXAM - RIGHT EYE: OD_EXAM: NORMAL

## 2024-03-13 ASSESSMENT — CONF VISUAL FIELD
OD_INFERIOR_NASAL_RESTRICTION: 3
OS_NORMAL: 1
OD_INFERIOR_TEMPORAL_RESTRICTION: 3
OS_INFERIOR_NASAL_RESTRICTION: 0
OS_SUPERIOR_NASAL_RESTRICTION: 0
OS_SUPERIOR_TEMPORAL_RESTRICTION: 0
METHOD: COUNTING FINGERS
OS_INFERIOR_TEMPORAL_RESTRICTION: 0

## 2024-03-13 ASSESSMENT — CUP TO DISC RATIO
OS_RATIO: 0.3
OD_RATIO: 0.2

## 2024-03-13 NOTE — NURSING NOTE
Chief Complaints and History of Present Illnesses   Patient presents with    Post Op (Ophthalmology) Right Eye     Aftercare following surgery of a sensory organ     Chief Complaint(s) and History of Present Illness(es)       Post Op (Ophthalmology) Right Eye              Laterality: right eye    Associated symptoms: floaters.  Negative for dryness, eye pain, tearing and flashes    Pain scale: 0/10    Comments: Aftercare following surgery of a sensory organ              Comments    Pt states vision is stable.  No pain.  No flashes.  No change to floaters.  No ocular meds.    JB Maradiaga March 13, 2024 7:12 AM

## 2024-03-13 NOTE — PROGRESS NOTES
CC -  Follow up stickler's s  - status post Anterior chamber wash out to remove Silicone Oil bubble 9.5.23  Interim: no eye pain; no flashes and floaters   SH: Patient is a [facility] -  Jalil Hernandez is a  23 year old year-old patient with h/o Stickler's syndrome, with h/o multiple RD repair OD, laser pexy OS, and also RD OS s/p repair with SB.  Seen in past at [facility] by Antonio Pascal, Klesert, and Carlos A, now following with Kirsty Almazan and Elham.    He was diagnosed with uveitis in the right eye, now stable.  Status post Macula hole repair left eye at [place]    Invitae testing: One Pathogenic variant and one Variant of Uncertain Significance identified in COL2A1. COL2A1 is associated with a spectrum of autosomal dominant and recessive conditions affecting the skeletal system.    PAST OCULAR SURGERY  status post Anterior chamber wash out to remove Silicone Oil bubble 9.5.23  Status post Macula hole repair left eye at [place] 2021  AC washout OD 12/19/18 by Dr. Almazan  RD repair OD (multiple, last PPV/SO and SOR) 2005 & 2010 (Klesert)  SBP OS 2014 (JT)  CE/IOL OD 2005 (Naida)    Ophtho Meds: None    OCULAR IMAGING  OCT mac 03/13/24   right eye- retina thinning. vitreous traction,  retinoschisis, possible small bubbles of Silicone Oil subhyaloid  left eye- Macula hole closed. Retina thinning    ONFL 03/13/24   Right eye: inconsistent- 03/13/24 diffuse retinal nerve fiber layer thinning  Left eye: TI borderline, otherwise normal     OVF 08/21/23   LVF right eye: peripheral decreased sensitivity- does not appear glaucoma. Patient with descentered intraocular lens possibly causing visual field defects. FP 3/12, MD -10.3 > -7.3  OVF24-2 left eye: enlarged blind spot, scattered central defects, MD -4.9 > 05.4    PHOTOS optos 02/22/23  consistent with exam   ECC 08/21/23  Right eye: 1575 CD, 573 pachy  Left eye: 2402 CD, 552 pachmetry    ASSESSMENT & PLAN    # history of Stickler's syndrome  - reviewed RT/RD  precautions  - genetic report: 03/03/22 COL2A1 is associated with a spectrum of autosomal dominant and recessive conditions affecting the skeletal system.  Retina attached in both eyes     #Postoperative eye state  -s/p AC washout, retained silicone oil removed, loose fragment removed, but residual fragment imbedded in iris inferiorly and was left in place 12.2018  -s/p AC washout with SOR right eye 9/5/23   - doing well     # history of full thickness macula hole, left eye  Status post repair at [place] 8.13.2021 Dr. Godwin  - noted on exam (4-14-21) unclear onset.   Macula hole closed     # History of RD repair OD with residual TRD   - last surgery 2010 with PPV/SO and SO removal   - small residual superior peripheral TRD noted by JT in 2014, stable  Retina attached    # History of RD repair OS   - s/p prophlyactic pexy (Klesert)   - s/p SBP 2014 (JT)   - status post additional laser treatment 9.12.18 and 9.25.18   - scleral buckle displaced anteriorly, not exposed. Noted since 9.2018. Per patient, no eye pain   -  no new tears, everything behind laser barricade.     # ERM OD with retinal traction   - may be residual posterior hyaloid- stable    - OCT shows some VRT with CME temporal mid-periphery   - OCT shows some SO under Epiretinal membrane    # History of Anterior uveitis, right eye   - likely was 2/2 retained of Sommering ring in anterior chamber causing inflammation   - s/p AC washout, retained silicone oil removed, loose fragment removed, but residual fragment imbedded in iris inferiorly and was left in place 12.2018   - no corneal edema; no activity   - discussed return precautions, restrictions;  Eye quite    #Cataract left eye  Posterior subcapsular cataract (PSC)    -Monitor, operate when visually significant. Patient happy with his vision    # Phacodonesis right eye, lens slightly  dislocated superiorly   - may need lens exchange if worsens, monitor for now     #  History of ocular hypertension   -  ONFL 08/21/23 possible supratemporal thinning left eye although segmentation inconsistent between scans   03/13/24 right eye no reliable tracings; left eye stable   - IOP 03/13/24 13/14   - currently off eyedrops   - optic nerve with small cups; retina thinning  -OVF each eye 08/21/23 stable from prior    Follow up 6 months prescription; LVF right eye; 24-2 left eye ; retinal nerve fiber layer ; Optical Coherence Tomography macula  - if worsening will consider starting glaucoma meds     Retina detachment precautions were discussed with the patient (presence or increased in flashes, floaters or a curtain in the visual field) and was asked to return if any of the those occur   warm compresses and artificial tears      ~~~~~~~~~~~~~~~~~~~~~~~~~~~~~~~~~~   Complete documentation of historical and exam elements from today's encounter can be found in the full encounter summary report (not reduplicated in this progress note).  I personally obtained the chief complaint(s) and history of present illness.  I confirmed and edited as necessary the review of systems, past medical/surgical history, family history, social history, and examination findings as documented by others; and I examined the patient myself.  I personally reviewed the relevant tests, images, and reports as documented above.  I formulated and edited as necessary the assessment and plan and discussed the findings and management plan with the patient and family    Marine Lizarraga MD   of Ophthalmology.  Retina Service   Department of Ophthalmology and Visual Neurosciences   [facility]  Phone: [phone]   Fax: [phone]

## 2024-06-11 ENCOUNTER — OFFICE VISIT (OUTPATIENT)
Dept: OPTOMETRY | Facility: CLINIC | Age: 24
End: 2024-06-11
Payer: COMMERCIAL

## 2024-06-11 DIAGNOSIS — H52.31 ANISOMETROPIA AND ANISEIKONIA: Primary | ICD-10-CM

## 2024-06-11 DIAGNOSIS — H52.32 ANISOMETROPIA AND ANISEIKONIA: Primary | ICD-10-CM

## 2024-06-11 DIAGNOSIS — H44.22 DEGENERATIVE MYOPIA OF LEFT EYE, UNSPECIFIED WHETHER COMPLICATION PRESENT: ICD-10-CM

## 2024-06-11 PROBLEM — Z98.890 OTHER SPECIFIED POSTPROCEDURAL STATES: Status: ACTIVE | Noted: 2021-08-13

## 2024-06-11 PROBLEM — Q89.8 OTHER SPECIFIED CONGENITAL MALFORMATIONS: Status: ACTIVE | Noted: 2021-06-25

## 2024-06-11 ASSESSMENT — VISUAL ACUITY
OD_PH_CC: 20/50
VA_OR_OD_CURRENT_RX: 20/50
CORRECTION_TYPE: GLASSES
OS_PH_CC+: -2
OS_CC: 20/50
METHOD: SNELLEN - LINEAR
OS_CC+: -2
OD_PH_CC+: -2
OS_PH_CC: 20/30
OD_CC: 20/70
OD_CC+: -2

## 2024-06-11 ASSESSMENT — REFRACTION_MANIFEST
OD_SPHERE: -3.00
OS_CYLINDER: SPHERE
OS_SPHERE: -12.50
OD_CYLINDER: +1.25
OD_AXIS: 103

## 2024-06-11 ASSESSMENT — REFRACTION_CURRENTRX
OD_DIAMETER: 14.2
OS_DIAMETER: 14.2
OD_BRAND: PRECISION 1 DAY
OS_BASECURVE: 8.3
OS_SPHERE: -9.50
OD_BASECURVE: 8.3
OD_SPHERE: -2.50
OS_BRAND: PRECISION 1 DAY

## 2024-06-11 ASSESSMENT — EXTERNAL EXAM - RIGHT EYE: OD_EXAM: NORMAL

## 2024-06-11 ASSESSMENT — REFRACTION_WEARINGRX
OD_SPHERE: -3.00
OS_CYLINDER: +0.50
OD_AXIS: 110
OS_SPHERE: -11.00
OD_CYLINDER: +0.75
SPECS_TYPE: SVL
OS_AXIS: 030

## 2024-06-11 ASSESSMENT — TONOMETRY
OS_IOP_MMHG: 21
OD_IOP_MMHG: 21
IOP_METHOD: ICARE

## 2024-06-11 ASSESSMENT — EXTERNAL EXAM - LEFT EYE: OS_EXAM: NORMAL

## 2024-06-11 NOTE — PROGRESS NOTES
A/P  1.) Anisometropia/Aniseikonia with High Myopia left eye, low myopia right eye  -Aniso of 8D in glasses, s/p IOL dislocation right eye  -Generally doing well in CL's, though limited in wear by dryness end of day/long hours  -Worthwhile to refit to different brand to test out. Otherwise he is wearing glasses but with noticeable image size difference  -BCVA 20/60 right, 20/40 left. Similar to previous    CL[facility] updated. Will order 6 month supply and mail direct to pt. F/u prn with lens concerns, otherwise 1-2 years here. Seeing retina dept regularly.    I have confirmed the patient's CC, HPI and reviewed Past Medical History, Past Surgical History, Social History, Family History, Problem List, Medication List and agree with Tech note.     Brianna Anderson, OD FAAO FSLS    Per last year's note, financial counselor [employer] MA should cover medically necessary diagnosis for Anisometropia/Aniseikonia    Contact Lens Billing  V-Code:  - Soft toric right,  Soft Sphere left  Final Contact Lens Rx         Brand Base Curve Diameter Sphere Cylinder Axis    Right Dailies Total 1 Astigmatism 8.6 14.5 -1.75 -1.25 010    Left Dailies Total 1 8.5 14.1 -10.50 Sphere       Expiration Date: 6/11/26    Replacement: Daily           Fait order mailed direct: [id number]  # of units:   2 90-packs right @ $125 per box  2 90-packs left @ $100 per box      This patient requires contact lenses that are medically necessary for either improvement in vision over spectacles, support of the ocular surface, or other therapeutic benefit. These are not cosmetic contact lenses.     Encounter Diagnoses   Name Primary?    Anisometropia and aniseikonia Yes    Degenerative myopia of left eye, unspecified whether complication present

## 2024-06-11 NOTE — NURSING NOTE
Chief Complaints and History of Present Illnesses   Patient presents with    Annual Eye Exam     Pt here for annual exam with Precision 1 contacts.      Chief Complaint(s) and History of Present Illness(es)       Annual Eye Exam              Laterality: both eyes    Comments: Pt here for annual exam with Precision 1 contacts.               Comments    Pt is status post Anterior chamber wash out to remove Silicone Oil bubble 9.5.2023. Vision is largely unchanged since last exam. Pt feels contacts are not working as well.     JB Mckoy on 6/11/2024 at 9:33 AM

## 2024-07-22 ENCOUNTER — TRANSFERRED RECORDS (OUTPATIENT)
Dept: HEALTH INFORMATION MANAGEMENT | Facility: CLINIC | Age: 24
End: 2024-07-22
Payer: COMMERCIAL

## 2024-09-06 DIAGNOSIS — H40.003 GLAUCOMA SUSPECT OF BOTH EYES: Primary | ICD-10-CM

## 2024-09-06 DIAGNOSIS — H35.342 MACULAR HOLE OF LEFT EYE: ICD-10-CM

## 2024-09-18 ENCOUNTER — OFFICE VISIT (OUTPATIENT)
Dept: OPHTHALMOLOGY | Facility: CLINIC | Age: 24
End: 2024-09-18
Attending: OPHTHALMOLOGY
Payer: COMMERCIAL

## 2024-09-18 DIAGNOSIS — H35.342 MACULAR HOLE OF LEFT EYE: ICD-10-CM

## 2024-09-18 DIAGNOSIS — H40.003 GLAUCOMA SUSPECT OF BOTH EYES: ICD-10-CM

## 2024-09-18 PROCEDURE — 92133 CPTRZD OPH DX IMG PST SGM ON: CPT | Performed by: OPHTHALMOLOGY

## 2024-09-18 PROCEDURE — G0463 HOSPITAL OUTPT CLINIC VISIT: HCPCS | Performed by: OPHTHALMOLOGY

## 2024-09-18 PROCEDURE — 92134 CPTRZ OPH DX IMG PST SGM RTA: CPT | Performed by: OPHTHALMOLOGY

## 2024-09-18 PROCEDURE — 99214 OFFICE O/P EST MOD 30 MIN: CPT | Performed by: OPHTHALMOLOGY

## 2024-09-18 PROCEDURE — 92083 EXTENDED VISUAL FIELD XM: CPT | Performed by: OPHTHALMOLOGY

## 2024-09-18 PROCEDURE — 99207 OCT OPTIC NERVE RNFL SPECTRALIS OU (BOTH EYES): CPT | Mod: 26 | Performed by: OPHTHALMOLOGY

## 2024-09-18 RX ORDER — MINOXIDIL 2.5 MG/1
2.5 TABLET ORAL DAILY
COMMUNITY
Start: 2024-08-28

## 2024-09-18 ASSESSMENT — REFRACTION_WEARINGRX
OS_SPHERE: -11.00
OS_CYLINDER: +0.50
OD_CYLINDER: +0.75
OD_AXIS: 110
SPECS_TYPE: SVL
OD_SPHERE: -3.00
OS_AXIS: 030

## 2024-09-18 ASSESSMENT — TONOMETRY
OS_IOP_MMHG: 20
OD_IOP_MMHG: 19
IOP_METHOD: TONOPEN

## 2024-09-18 ASSESSMENT — VISUAL ACUITY
OS_CC+: -2
OD_CC: 20/70
OS_PH_CC: 20/50
OS_CC: 20/50
CORRECTION_TYPE: GLASSES
OS_PH_CC+: -1
METHOD: SNELLEN - LINEAR

## 2024-09-18 ASSESSMENT — EXTERNAL EXAM - LEFT EYE: OS_EXAM: NORMAL

## 2024-09-18 ASSESSMENT — CUP TO DISC RATIO
OS_RATIO: 0.3
OD_RATIO: 0.2

## 2024-09-18 ASSESSMENT — EXTERNAL EXAM - RIGHT EYE: OD_EXAM: NORMAL

## 2024-09-18 NOTE — PROGRESS NOTES
CC -  Follow up stickler's s  - status post Anterior chamber wash out to remove Silicone Oil bubble 9.5.23  Interim: No change in vision, no eye pain; no flashes and floaters   SH: Patient is a [facility] -  Jalil Hernandez is a  24 year old year-old patient with h/o Stickler's syndrome, with h/o multiple RD repair OD, laser pexy OS, and also RD OS s/p repair with SB.  Seen in past at [facility] by Antonio Pascal, Klesert, and Carlos A, now following with Kirsty Almazan and Elham.    He was diagnosed with uveitis in the right eye, now stable.  Status post Macula hole repair left eye at [place]    Invitae testing: One Pathogenic variant and one Variant of Uncertain Significance identified in COL2A1. COL2A1 is associated with a spectrum of autosomal dominant and recessive conditions affecting the skeletal system.    PAST OCULAR SURGERY  status post Anterior chamber wash out to remove Silicone Oil bubble 9.5.23  Status post Macula hole repair left eye at [place] 2021  AC washout OD 12/19/18 by Dr. Almazan  RD repair OD (multiple, last PPV/SO and SOR) 2005 & 2010 (Klesert)  SBP OS 2014 (DIXIET)  CE/IOL OD 2005 (Naida)    Ophtho Meds: None    OCULAR IMAGING  OCT mac 9/18/24   right eye- retina thinning. vitreous traction,  retinoschisis, possible small bubbles of Silicone Oil subhyaloid  left eye- Macula hole closed. Retina thinning; focal disruption of the Ellipsoid Zone      ONFL9/18/24   Right eye: poor reliability of tracing- diffuse retinal nerve fiber layer thinning  Left eye: TI borderline, otherwise normal     OVF 9/18/24    LVF right eye: peripheral decreased sensitivity- does not appear glaucoma. Patient with descentered intraocular lens possibly causing visual field defects. FP 3/12, MD -8.3 from 7.6  OVF24-2 left eye: scattered central defects, MD -2.3 from -4.4    PHOTOS optos 02/22/23  consistent with exam   ECC 08/21/23  Right eye: 1575 CD, 573 pachy  Left eye: 2402 CD, 552 pachmetry    ASSESSMENT & PLAN    # history  of Stickler's syndrome  - reviewed RT/RD precautions  - genetic report: 03/03/22 COL2A1 is associated with a spectrum of autosomal dominant and recessive conditions affecting the skeletal system.  Retina attached in both eyes     # History of RD repair OD with residual TRD   - last surgery 2010 with PPV/SO and SO removal   - small residual superior peripheral TRD noted by JT in 2014, stable  Retina attached    # history of Silicone Oil in Anterior chamber right eye   Now Postoperative eye state  -s/p AC washout, retained silicone oil removed, loose fragment removed, but residual fragment imbedded in iris inferiorly and was left in place 12.2018  -s/p AC washout with SOR right eye 9/5/23   - doing well     # History of RD repair OS   - s/p prophlyactic pexy (Klesert)   - s/p SBP 2014 (JT)   - status post additional laser treatment 9.12.18 and 9.25.18   - scleral buckle displaced anteriorly, not exposed. Noted since 9.2018. Per patient, no eye pain   -  no new tears, everything behind laser barricade.       # history of full thickness macula hole, left eye  Status post repair at [place] 8.13.2021 Dr. Godwin  - noted on exam (4-14-21) unclear onset.   Macula hole closed     # ERM OD with retinal traction   - may be residual posterior hyaloid- stable    - OCT shows some VRT with CME temporal mid-periphery   - OCT shows some SO under Epiretinal membrane    # History of Anterior uveitis, right eye   - likely was 2/2 retained of Sommering ring in anterior chamber causing inflammation   - s/p AC washout, retained silicone oil removed, loose fragment removed, but residual fragment imbedded in iris inferiorly and was left in place 12.2018   - no corneal edema; no activity   - discussed return precautions, restrictions;  Eye quite    #Cataract left eye  Posterior subcapsular cataract (PSC)    -Monitor, operate when visually significant. Patient happy with his vision    # Phacodonesis right eye, lens slightly  dislocated  superiorly   - may need lens exchange if worsens, monitor for now     #  History of ocular hypertension  Intraocular pressure 09/18/24 19/20  ONFL9/18/24   Right eye: poor reliability of tracing- diffuse retinal nerve fiber layer thinning  Left eye: TI borderline, otherwise normal     OVF 9/18/24    LVF right eye: peripheral decreased sensitivity- does not appear glaucoma. Patient with descentered intraocular lens possibly causing visual field defects. FP 3/12, MD -8.3 from 7.6  OVF24-2 left eye: scattered central defects, MD -2.3 from -4.4    - optic nerve with small cups; retina thinning    PLAN:   Follow up 6-8 months with optos and Coherence Tomography macula  Follow up with  glaucoma in the next 4 months    Retina detachment precautions were discussed with the patient (presence or increased in flashes, floaters or a curtain in the visual field) and was asked to return if any of the those occur   warm compresses and artificial tears      ~~~~~~~~~~~~~~~~~~~~~~~~~~~~~~~~~~   Complete documentation of historical and exam elements from today's encounter can be found in the full encounter summary report (not reduplicated in this progress note).  I personally obtained the chief complaint(s) and history of present illness.  I confirmed and edited as necessary the review of systems, past medical/surgical history, family history, social history, and examination findings as documented by others; and I examined the patient myself.  I personally reviewed the relevant tests, images, and reports as documented above.  I formulated and edited as necessary the assessment and plan and discussed the findings and management plan with the patient and family    Marine Lizarraga MD   of Ophthalmology.  Retina Service   Department of Ophthalmology and Visual Neurosciences   [facility]  Phone: [phone]   Fax: [phone]    spouse

## 2024-09-18 NOTE — NURSING NOTE
"Chief Complaints and History of Present Illnesses   Patient presents with    Ocular Hypertension Follow Up     6 month follow up for Ocular Hypertension each eye.     Patient notes vision is stable each eye over the last 6 months. Denies new floaters or flashes. \"I noticed an increase in eye boogers over the last year. It's been mostly with the left eye.\" No glaucoma drops.     Shantal Diego, COT 7:34 AM 09/18/2024       Chief Complaint(s) and History of Present Illness(es)       Ocular Hypertension Follow Up              Laterality: both eyes    Associated symptoms: discharge.  Negative for dryness and eye pain    Pain scale: 0/10    Comments: 6 month follow up for Ocular Hypertension each eye.     Patient notes vision is stable each eye over the last 6 months. Denies new floaters or flashes. \"I noticed an increase in eye boogers over the last year. It's been mostly with the left eye.\" No glaucoma drops.     Shantal Diego, COT 7:34 AM 09/18/2024                      "

## 2024-09-30 ENCOUNTER — OFFICE VISIT (OUTPATIENT)
Dept: OPHTHALMOLOGY | Facility: CLINIC | Age: 24
End: 2024-09-30
Attending: OPHTHALMOLOGY
Payer: COMMERCIAL

## 2024-09-30 DIAGNOSIS — Q89.8 STICKLER SYNDROME: ICD-10-CM

## 2024-09-30 DIAGNOSIS — H18.421 BAND KERATOPATHY OF RIGHT EYE: Primary | ICD-10-CM

## 2024-09-30 DIAGNOSIS — H25.042 POSTERIOR SUBCAPSULAR POLAR AGE-RELATED CATARACT OF LEFT EYE: ICD-10-CM

## 2024-09-30 PROCEDURE — G0463 HOSPITAL OUTPT CLINIC VISIT: HCPCS | Performed by: OPHTHALMOLOGY

## 2024-09-30 PROCEDURE — 99214 OFFICE O/P EST MOD 30 MIN: CPT | Performed by: OPHTHALMOLOGY

## 2024-09-30 ASSESSMENT — VISUAL ACUITY
OS_CC+: -2+1
OD_CC: 20/70
METHOD: SNELLEN - LINEAR
OS_PH_CC+: -1
OS_PH_CC: 20/40
CORRECTION_TYPE: GLASSES
OD_CC+: -2
OS_CC: 20/60

## 2024-09-30 ASSESSMENT — REFRACTION_WEARINGRX
OS_CYLINDER: +0.50
OD_SPHERE: -3.00
OD_AXIS: 110
OS_SPHERE: -11.00
SPECS_TYPE: SVL
OD_CYLINDER: +0.75
OS_AXIS: 030

## 2024-09-30 ASSESSMENT — TONOMETRY
OD_IOP_MMHG: 17
IOP_METHOD: ICARE
OS_IOP_MMHG: 22

## 2024-09-30 ASSESSMENT — CUP TO DISC RATIO
OD_RATIO: 0.2
OS_RATIO: 0.3

## 2024-09-30 ASSESSMENT — EXTERNAL EXAM - LEFT EYE: OS_EXAM: NORMAL

## 2024-09-30 ASSESSMENT — EXTERNAL EXAM - RIGHT EYE: OD_EXAM: NORMAL

## 2024-09-30 NOTE — PROGRESS NOTES
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 Patient developed anterior uveitis 2/2 presumed chronic retained lens fragment in the inferior angle. This was removed with AC washout 2018, with residual tr fragments scarred to the inferior endothelium left. Corneal edema and uveitis resolved. Patient has mild pseudophakodonesis OD, but stable for several years.      Interval hx 09/30/2024: Patient saw Dr. Lizarraga - recommended seeing glaucoma   Chief Complaint(s) and History of Present Illness(es)       Band Keratopathy Follow Up    In right eye.  Associated symptoms include mattering and discharge.  Negative for eye pain, dryness, foreign body sensation and photophobia. Additional comments: Band keratopathy of right eye             Comments    Pt states vision has been ok, no changes.  No pain.  RE Pt can see a line in vision when looking up from the gas bubble.  No flashes.  No change to floaters.  Some discharge LE.  No ocular meds.  No DM.    JB Maradiaga September 30, 2024 9:01 AM                           Past ocular history   AC washout OD 12/19/18 by Dr. Almazan  RD repair OD (multiple, last PPV/SO and SOR) 2005 & 2010 (Klesert)  SBP OS 2014 (JT)  CE/IOL OD 2005 (Bothun)  [facility] retained SO [facility]     Past Medical History:   Diagnosis Date    Band-shaped keratopathy     RE    Glaucoma suspect     BE    Iritis     RE    Myopia     BE    RETINAL DETACHMENT NEC     BE    Staphyloma posticum     RE    Stickler's syndrome     Vitamin D deficiency 2019       PSH     Past Surgical History:   Procedure Laterality Date    EXAM UNDER ANESTHESIA, LASER DIODE RETINA, COMBINED  4/15/2008    laser retinopexy LE    RECONSTRUCT ANTERIOR CHAMBER Right 12/18/2018    Procedure: Right Eye Anterior  Chamber Wash Out and Lens Fragment Removal;  Surgeon: Tutu Almazan MD;  Location: [facility] OR    RECONSTRUCT ANTERIOR CHAMBER Right 9/5/2023    Procedure: Anterior chamber washout right eye;  Surgeon: Marine Lizarraga MD;  Location: [facility] OR    RETINAL REATTACHMENT      SCLERAL BUCKLE  1/15/2014    LE    STRABISMUS SURGERY  8/13/2009    Bilateral medial rectus recession, bilateral inferior oblique myectomy    VITRECTOMY PARSPLANA  5/17/2005    PPV, EL, SO RE    VITRECTOMY PARSPLANA  12/27/2005    PPV, PPL, SO removal RE    VITRECTOMY PARSPLANA  3/17/2010    PPV, EL, MP, PI RE    VITRECTOMY PARSPLANA  8/11/2010    PPV, EL, SO removal RE       Meds     Current Outpatient Medications   Medication    clindamycin (CLEOCIN T) 1 % external lotion    finasteride (PROSCAR) 5 MG tablet    multivitamin w/minerals (MULTI-VITAMIN) tablet    neomycin-polymyxin-dexAMETHasone (MAXITROL) 3.5-31477-3.1 ophthalmic ointment     No current facility-administered medications for this visit.     Drops Currently Taking   None    Assessment/Plan   # s/p AC washout for retained SO OD  - no significant K edema    # Cataract OS  - not visually significant  - monitor    # Band keratopathy OD  - likely related to history of silicone oil  - not visually significant, temporal periphery, monitor    # Pseudophakodonesis OD  - stable x 3 years, lens seems stable in the sulcus  - if the sommering's ring displaces into the visual axis - may be sufficient to YAG or perform PPVx to debulk it  - continue to monitor at this time    # Anterior uveitis OD  - quiet today on daily maxitrol  - monitor - likely 2/2 to previous chronic retained lens fragment      # Stickler syndrome s/p multiple RD with repair  - continue to follow-up with retina     Follow up:  1 year with Cornea w/ V,T, at next visit, call if problems sooner to clinic.    Follow up with other provider : Elham as scheduled    Attending Physician Attestation:  Complete documentation  of historical and exam elements from today's encounter can be found in the full encounter summary report (not reduplicated in this progress note).  I personally obtained the chief complaint(s) and history of present illness.  I confirmed and edited as necessary the review of systems, past medical/surgical history, family history, social history, and examination findings as documented by others; and I examined the patient myself.  I personally reviewed the relevant tests, images, and reports as documented above.  I formulated and edited as necessary the assessment and plan and discussed the findings and management plan with the patient and family. - Tutu Almazan MD

## 2024-09-30 NOTE — NURSING NOTE
Chief Complaints and History of Present Illnesses   Patient presents with    Band Keratopathy Follow Up     Band keratopathy of right eye     Chief Complaint(s) and History of Present Illness(es)       Band Keratopathy Follow Up              Laterality: right eye    Associated symptoms: mattering and discharge.  Negative for eye pain, dryness, foreign body sensation and photophobia    Comments: Band keratopathy of right eye              Comments    Pt states vision has been ok, no changes.  No pain.  RE Pt can see a line in vision when looking up from the gas bubble.  No flashes.  No change to floaters.  Some discharge LE.  No ocular meds.  No DM.    JB Maradiaga September 30, 2024 9:01 AM

## 2025-01-04 ENCOUNTER — HEALTH MAINTENANCE LETTER (OUTPATIENT)
Age: 25
End: 2025-01-04

## 2025-01-20 DIAGNOSIS — H40.003 GLAUCOMA SUSPECT OF BOTH EYES: Primary | ICD-10-CM

## 2025-01-20 NOTE — PROGRESS NOTES
Chief Complaint/Presenting Concern: Ocular hypertension    History of Present Illness:   Jalil Hernandez is a 24 year old patient who presents for evaluation of ocular hypertension. 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     Relevant Past Medical/Family/Social History:  Noncontributory     Relevant Review of Systems: noncontributory      Diagnosis: Glaucoma suspect each eye   Year diagnosis 2018  Previous glaucoma surgery/laser  Maximum intraocular pressure 24/22 mmHg   Currently Meds none   Family history: positive brother as a child and possibly grandparent   /598  Gonio  Right eye multiple areas of broad PAS  Left eye open 360 to TM   Refractive status: Axial myopia, high hyperopia  Trauma history: positive 2018 hand to eye causing lens subluxation   Steroid exposure: positive topical   Vasospastic disease: Migrane/Raynaud phenomenon: negative  A past hemodynamic crisis or Low BP:: negative  Meds AEs/intolerance: none   PMHx: Asthma and respiratory problems/Cardiac/Renal/Kidney stones/Sulfa Allergy  Anticoagulants none  Today's testing:  Visual field January 21, 2025:   24-2 size V Right eye - reliable inferior nasal step, reliable;   24-2 size III Left eye - normal, unreliable   OCT Optic Nerve RNFL Spectralis January 21, 2025  Right eye - unreliable due to VMT/scarring, reliable;   Left eye - normal, stable     Additional Ocular History:   Retinal detachment right eye   -RD repair OD (multiple, last PPV/SO and SOR) 2005 & 2010 (Klesert)  -status post Anterior chamber wash out to remove Silicone Oil bubble 9.5.23    Retinal detachment left eye   -SBP OS 2014 (DIXIET)  - Status post Macula hole repair left eye at [place] 2021    Pseudophakia right eye   - CE/IOL OD 2005  (Bothun)  - AC washout right eye for retained lens fragment 12/19/18 by Dr. Almazan  - Pseudophacodonesis, stable for many years     Cataract left eye   - Not VS     LEFT EYE exposed scleral buckle   - antibiotics, refer to retina     Plan/Recommendations:  Discussed findings with patient.  He has a complex ocular history with multiple surgeries and  history of ocular hypertension. IOP ok today off treatment. Right eye possibly inferonasal step. Left eye open angles without any RNFL thinning.   Exposed buckle left eye, will be seen by retina today.     RTC 6 months VF, OCT RNFL     Marcio Agustin MD  Resident Physician, PGY-3  Department of Ophthalmology     Physician Attestation     Attending Physician Attestation:  Complete documentation of historical and exam elements from today's encounter can be found in the full encounter summary report (not reduplicated in this progress note). I personally obtained the chief complaint(s) and history of present illness. I confirmed and edited as necessary the review of systems, past medical/surgical history, family history, social history, and examination findings as documented by others; and I examined the patient myself. I personally reviewed the relevant tests, images, and reports as documented above. I personally reviewed the ophthalmic test(s) associated with this encounter, agree with the interpretation(s) as documented by the resident/fellow and have edited the corresponding report(s) as necessary. I formulated and edited as necessary the assessment and plan and discussed the findings and management plan with the patient and any family members present at the time of the visit.  Loraine Mcguire M.D., Glaucoma, January 21, 2025

## 2025-01-21 ENCOUNTER — OFFICE VISIT (OUTPATIENT)
Dept: OPHTHALMOLOGY | Facility: CLINIC | Age: 25
End: 2025-01-21
Attending: OPHTHALMOLOGY
Payer: COMMERCIAL

## 2025-01-21 DIAGNOSIS — H40.003 GLAUCOMA SUSPECT OF BOTH EYES: ICD-10-CM

## 2025-01-21 DIAGNOSIS — Q89.8 STICKLER SYNDROME: ICD-10-CM

## 2025-01-21 DIAGNOSIS — H43.393 VITREOUS SYNERESIS OF BOTH EYES: ICD-10-CM

## 2025-01-21 DIAGNOSIS — Z86.69 HISTORY OF RETINAL DETACHMENT: ICD-10-CM

## 2025-01-21 DIAGNOSIS — H33.40 TRACTION DETACHMENT WITH VITREORETINAL ORGANIZATION: ICD-10-CM

## 2025-01-21 DIAGNOSIS — T85.398D EXTRUSION OF SCLERAL BUCKLE, SUBSEQUENT ENCOUNTER: Primary | ICD-10-CM

## 2025-01-21 PROCEDURE — 92083 EXTENDED VISUAL FIELD XM: CPT | Performed by: OPHTHALMOLOGY

## 2025-01-21 PROCEDURE — 92133 CPTRZD OPH DX IMG PST SGM ON: CPT | Performed by: OPHTHALMOLOGY

## 2025-01-21 PROCEDURE — 76514 ECHO EXAM OF EYE THICKNESS: CPT | Performed by: OPHTHALMOLOGY

## 2025-01-21 PROCEDURE — 92020 GONIOSCOPY: CPT | Performed by: OPHTHALMOLOGY

## 2025-01-21 PROCEDURE — G0463 HOSPITAL OUTPT CLINIC VISIT: HCPCS | Performed by: OPHTHALMOLOGY

## 2025-01-21 RX ORDER — OFLOXACIN 3 MG/ML
1-2 SOLUTION/ DROPS OPHTHALMIC EVERY MORNING
Qty: 10 ML | Refills: 1 | Status: SHIPPED | OUTPATIENT
Start: 2025-01-21

## 2025-01-21 ASSESSMENT — TONOMETRY
OD_IOP_MMHG: 18
IOP_METHOD: TONOPEN
OS_IOP_MMHG: 18
OS_IOP_MMHG: 19
IOP_METHOD: APPLANATION
OS_IOP_MMHG: 18
IOP_METHOD: TONOPEN

## 2025-01-21 ASSESSMENT — VISUAL ACUITY
OS_CC+: -2
OS_PH_CC: 20/30
OD_CC: 20/80
METHOD: SNELLEN - LINEAR
CORRECTION_TYPE: GLASSES
OD_CC: 20/80
OD_PH_CC+: -2
METHOD: SNELLEN - LINEAR
OS_PH_CC+: -2
OD_PH_CC: 20/60
OS_CC+: -2
OS_PH_CC: 20/30
CORRECTION_TYPE: GLASSES
OD_PH_CC: 20/60
OS_CC: 20/40
OD_PH_CC+: -2
OS_CC: 20/40
OS_PH_CC+: -2

## 2025-01-21 ASSESSMENT — REFRACTION_WEARINGRX
OS_CYLINDER: SPHERE
OD_AXIS: 103
OD_SPHERE: -3.00
OS_SPHERE: -12.50
OD_CYLINDER: +1.25
OD_CYLINDER: +1.25
OS_SPHERE: -12.50
OD_SPHERE: -3.00
OD_AXIS: 103
OS_CYLINDER: SPHERE

## 2025-01-21 ASSESSMENT — SLIT LAMP EXAM - LIDS
COMMENTS: INFERIOR TEMPORAL PROMINENCE OF THE SCLERAL BUCKLE. NO EXPOSURE, BUT POSSIBLE SLIGHTLY DISPLACEMENT
COMMENTS: NORMAL
COMMENTS: NORMAL

## 2025-01-21 ASSESSMENT — EXTERNAL EXAM - LEFT EYE
OS_EXAM: NORMAL
OS_EXAM: NORMAL

## 2025-01-21 ASSESSMENT — EXTERNAL EXAM - RIGHT EYE
OD_EXAM: NORMAL
OD_EXAM: NORMAL

## 2025-01-21 ASSESSMENT — PACHYMETRY
OS_CT(UM): 598
OD_CT(UM): 618

## 2025-01-21 ASSESSMENT — CUP TO DISC RATIO
OD_RATIO: 0.2
OS_RATIO: 0.3
OD_RATIO: 0.2
OS_RATIO: 0.3

## 2025-01-21 ASSESSMENT — CONF VISUAL FIELD
OD_INFERIOR_TEMPORAL_RESTRICTION: 3
OD_SUPERIOR_TEMPORAL_RESTRICTION: 3
OS_SUPERIOR_TEMPORAL_RESTRICTION: 0
OS_NORMAL: 1
OD_SUPERIOR_TEMPORAL_RESTRICTION: 3
OS_NORMAL: 1
OS_INFERIOR_TEMPORAL_RESTRICTION: 0
OD_INFERIOR_TEMPORAL_RESTRICTION: 3
OS_SUPERIOR_TEMPORAL_RESTRICTION: 0
OS_INFERIOR_NASAL_RESTRICTION: 0
OS_SUPERIOR_NASAL_RESTRICTION: 0
OS_INFERIOR_NASAL_RESTRICTION: 0
OS_SUPERIOR_NASAL_RESTRICTION: 0
OS_INFERIOR_TEMPORAL_RESTRICTION: 0

## 2025-01-21 NOTE — PROGRESS NOTES
CC -  Follow up stickler's s  - status post Anterior chamber wash out to remove Silicone Oil bubble 9.5.23  Interim: No change in vision, no eye pain; no flashes and floaters   SH: Patient is a [facility] -  Jalil Hernandez is a  24 year old year-old patient with h/o Stickler's syndrome, with h/o multiple RD repair OD, laser pexy OS, and also RD OS s/p repair with SB.  Seen in past at [facility] by Antonio Pascal, Klesert, and Carlos A, now following with Kirsty Almazan and Elham.    He was diagnosed with uveitis in the right eye, now stable.  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 COL2A1 is associated with a spectrum of autosomal dominant and recessive conditions affecting the skeletal system.    PAST OCULAR SURGERY  status post Anterior chamber wash out to remove Silicone Oil bubble 9.5.23  Status post Macula hole repair left eye at [place] 2021  AC washout OD 12/19/18 by Dr. Almazan  RD repair OD (multiple, last PPV/SO and SOR) 2005 & 2010 (Klesert)  SBP OS 2014 (DIXIET)  CE/IOL OD 2005 (Naida)    Ophtho Meds: None    OCULAR IMAGING  OCT mac 9/18/24   right eye- retina thinning. vitreous traction,  retinoschisis, possible small bubbles of Silicone Oil subhyaloid  left eye- Macula hole closed. Retina thinning; focal disruption of the Ellipsoid Zone      ONFL9/18/24   Right eye: poor reliability of tracing- diffuse retinal nerve fiber layer thinning  Left eye: TI borderline, otherwise normal     OVF 9/18/24    LVF right eye: peripheral decreased sensitivity- does not appear glaucoma. Patient with descentered intraocular lens possibly causing visual field defects. FP 3/12, MD -8.3 from 7.6  OVF24-2 left eye: scattered central defects, MD -2.3 from -4.4    PHOTOS optos 02/22/23  consistent with exam   ECC 08/21/23  Right eye: 1575 CD, 573 pachy  Left eye: 2402 CD, 552 pachmetry    ASSESSMENT & PLAN    # history  of Stickler's syndrome  - reviewed RT/RD precautions  - genetic report: 03/03/22 COL2A1 is associated with a spectrum of autosomal dominant and recessive conditions affecting the skeletal system.  Retina attached in both eyes     # History of RD repair OD with residual TRD   - last surgery 2010 with PPV/SO and SO removal   - small residual superior peripheral TRD noted by JT in 2014, stable  Retina attached    # history of Silicone Oil in Anterior chamber right eye   Now Postoperative eye state  -s/p AC washout, retained silicone oil removed, loose fragment removed, but residual fragment imbedded in iris inferiorly and was left in place 12.2018  -s/p AC washout with SOR right eye 9/5/23   - doing well     # History of RD repair OS   - s/p prophlyactic pexy (Klesert)   - s/p SBP 2014 (JT)   - status post additional laser treatment 9.12.18 and 9.25.18   - scleral buckle displaced anteriorly, not exposed. Noted since 9.2018. Per patient, no eye pain   -  no new tears, everything behind laser barricade.       # history of full thickness macula hole, left eye  Status post repair at [facility] 8.13.2021 Dr. Godwin  - noted on exam (4-14-21) unclear onset.   Macula hole closed     # ERM OD with retinal traction   - may be residual posterior hyaloid- stable    - OCT shows some VRT with CME temporal mid-periphery   - OCT shows some SO under Epiretinal membrane    # History of Anterior uveitis, right eye   - likely was 2/2 retained of Sommering ring in anterior chamber causing inflammation   - s/p AC washout, retained silicone oil removed, loose fragment removed, but residual fragment imbedded in iris inferiorly and was left in place 12.2018   - no corneal edema; no activity   - discussed return precautions, restrictions;  Eye quite    #Cataract left eye  Posterior subcapsular cataract (PSC)    -Monitor, operate when visually significant. Patient happy with his vision    # Phacodonesis right eye, lens slightly  dislocated  superiorly   - may need lens exchange if worsens, monitor for now     #  History of ocular hypertension  Intraocular pressure 09/18/24 19/20  ONFL9/18/24   Right eye: poor reliability of tracing- diffuse retinal nerve fiber layer thinning  Left eye: TI borderline, otherwise normal     OVF 9/18/24    LVF right eye: peripheral decreased sensitivity- does not appear glaucoma. Patient with descentered intraocular lens possibly causing visual field defects. FP 3/12, MD -8.3 from 7.6  OVF24-2 left eye: scattered central defects, MD -2.3 from -4.4    - optic nerve with small cups; retina thinning    PLAN:   Follow up 6-8 months with optos and Coherence Tomography macula  Follow up with  glaucoma in the next 4 months    Retina detachment precautions were discussed with the patient (presence or increased in flashes, floaters or a curtain in the visual field) and was asked to return if any of the those occur   warm compresses and artificial tears      ~~~~~~~~~~~~~~~~~~~~~~~~~~~~~~~~~~   Complete documentation of historical and exam elements from today's encounter can be found in the full encounter summary report (not reduplicated in this progress note).  I personally obtained the chief complaint(s) and history of present illness.  I confirmed and edited as necessary the review of systems, past medical/surgical history, family history, social history, and examination findings as documented by others; and I examined the patient myself.  I personally reviewed the relevant tests, images, and reports as documented above.  I formulated and edited as necessary the assessment and plan and discussed the findings and management plan with the patient and family    Marine Lizarraga MD   of Ophthalmology.  Retina Service   Department of Ophthalmology and Visual Neurosciences   [facility]  Phone: [phone]   Fax: [phone]

## 2025-01-21 NOTE — PROGRESS NOTES
CC: exposed buckle left eye     History of Present Illness:   Jalil Hernandez is a 24 year old patient referred by Dr Mcguire for left eye exposed buckle. He was originally seen by Dr Mcguire for evaluation of ocular hypertension. 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   He is being see by Dr. Lizarraga routinely and next appt is 5/18/25.  Jalil says that Dr. Lizarraga noted small exposure of scleral buckle left eye before and was watching it. He has not had any issues with it and when he looks in the mirror, he doesn't see any changes.     Relevant Past Medical/Family/Social History:  Noncontributory     Relevant Review of Systems: noncontributory      OCT 1/21/25  Right eye retina attached with remnants of PHF with tractions around macula and subhyaloid emulsified silicone oil  Left eye retina attached; irregular inner retina; subfoveal EZ disruption        A/P:   # Stickler's syndrome  # LEFT EYE exposed scleral buckle   - complex hx detailed below  - was noted before. Today doesn't seem infected; small exposure over the end of buckle at superonasal  - will give once daily ofloxacin to prevent infection and asked him to see Dr. 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 Almazan  - Pseudophacodonesis, stable for many years     # Cataract left eye   - Not VS    RTC with Dr. Lizarraga in 1-2 months    Complete documentation of historical and exam elements from today's encounter can be found in the full encounter summary report (not reduplicated in this progress note). I personally obtained the chief complaint(s) and history of present illness.  I confirmed and edited as necessary the review of systems, past medical/surgical history, family history, social history, and examination findings as documented by others; and I examined the patient myself. I personally reviewed the relevant tests, images, and reports as documented above. I formulated and edited as necessary the assessment and plan and discussed the findings and management plan with the patient and family.    Aidan Hernández MD, PhD

## 2025-03-20 DIAGNOSIS — H35.342 MACULAR HOLE OF LEFT EYE: Primary | ICD-10-CM

## 2025-03-26 ENCOUNTER — OFFICE VISIT (OUTPATIENT)
Dept: OPHTHALMOLOGY | Facility: CLINIC | Age: 25
End: 2025-03-26
Attending: OPHTHALMOLOGY
Payer: COMMERCIAL

## 2025-03-26 ENCOUNTER — TELEPHONE (OUTPATIENT)
Dept: OPHTHALMOLOGY | Facility: CLINIC | Age: 25
End: 2025-03-26

## 2025-03-26 DIAGNOSIS — T85.398A EXTRUSION OF SCLERAL BUCKLE, INITIAL ENCOUNTER: Primary | ICD-10-CM

## 2025-03-26 DIAGNOSIS — H35.342 MACULAR HOLE OF LEFT EYE: ICD-10-CM

## 2025-03-26 PROCEDURE — 92134 CPTRZ OPH DX IMG PST SGM RTA: CPT | Performed by: OPHTHALMOLOGY

## 2025-03-26 PROCEDURE — G0463 HOSPITAL OUTPT CLINIC VISIT: HCPCS | Performed by: OPHTHALMOLOGY

## 2025-03-26 ASSESSMENT — VISUAL ACUITY
OS_PH_CC: 20/30
OD_CC: 20/60
OS_CC: 20/40
CORRECTION_TYPE: GLASSES
OD_CC+: -2
METHOD: SNELLEN - LINEAR

## 2025-03-26 ASSESSMENT — CUP TO DISC RATIO
OD_RATIO: 0.2
OS_RATIO: 0.3

## 2025-03-26 ASSESSMENT — TONOMETRY
IOP_METHOD: TONOPEN
OD_IOP_MMHG: 19
OS_IOP_MMHG: 18

## 2025-03-26 ASSESSMENT — EXTERNAL EXAM - RIGHT EYE: OD_EXAM: NORMAL

## 2025-03-26 ASSESSMENT — EXTERNAL EXAM - LEFT EYE: OS_EXAM: NORMAL

## 2025-03-26 ASSESSMENT — SLIT LAMP EXAM - LIDS: COMMENTS: NORMAL

## 2025-03-26 NOTE — TELEPHONE ENCOUNTER
Called patient to schedule surgery with Dr. Lizarraga     Spoke with: Jalil    Date(s) of Surgery: 4/15/25    Patient aware of approximate arrival time: Yes at pre op nursing will call      Tissue: Not Applicable Ordered: Not Applicable     [facility]     Pre-[facility] H&P: [facility]      Informed patient that they need to call to schedule pre-op H&P within 30 days of surgery date: Yes    Post-Op Appt Dates:   4/16/25 9am   4/23/25 9am   5/21/25 9am      Discussed with patient pre-op RN will call 2-3 days prior to surgery with arrival time and instructions:  Yes       Standard Surgery Packet Sent: Yes 03/26/25  via [employer] Message      Additional Information Sent in Packet:        Informed patient that they will need an adult  to bring patient home from surgery: Yes  : brother          Additional Comments:        All patients questions were answered and was instructed to review surgical packet and call back [phone] with any questions or concerns.       Leida Batista on 3/26/2025 at 9:48 AM

## 2025-03-26 NOTE — PROGRESS NOTES
CC: exposed buckle left eye     History of Present Illness:   Jalil Hernandez is a 24 year old patient referred by Dr Mcguire for left eye exposed buckle. He was originally seen by Dr Mcguire for evaluation of ocular hypertension. 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   He is being see by Dr. Lizarraga routinely and next appt is 5/18/25.  Jalil says that Dr. Lizarraga noted small exposure of scleral buckle left eye before and was watching it. He has not had any issues with it and when he looks in the mirror, he doesn't see any changes.     Relevant Past Medical/Family/Social History:  Noncontributory     Relevant Review of Systems: noncontributory      OCT 03/26/25   Right eye retina attached with remnants of PHF with tractions around macula and subhyaloid emulsified silicone oil  Left eye retina attached; irregular inner retina; subfoveal EZ disruption      A/P:   # Stickler's syndrome  Retina attached both eyes     # LEFT EYE exposed scleral buckle   - complex hx detailed below  - was noted before. Today doesn't seem infected; exposure over the end of buckle at superonasal  - continue daily ofloxacin to prevent infection.    - recommend scleral buckle removal  Risks, benefits and alternatives discussed with patient and agreed to proceed    Plan for removal of Scleral buckle left eye   Surgeon procedure time:  30 min   Urgency of Surgery:  in the next few weeks- Aim for April 15  Post-op apps needed: 1day; 1 week; 3 weeks  Multi surgeon case: No   H&P completed by primary care physician or PAC   needed: no   Anesthesia: sedation and peribulbar block  Need for pos-op chair for positioning NO    Risks, benefits and alternatives discussed with patient: 1:1000 risk  In-patient at [address] - 4/25 - 4/27/2023 4/25/2023 = EXCISIONAL DEBRIDEMENT UMBILICAL HERNIA (Abdomen)    Called and spoke to patient  She reports no F/V/N/C  Normal bowel movements  VNA was out today  POPV = 5/9/2023  Patient will call office, if needed  of infection/bleed/ further loss of vision; 1:100 risk of Retinal detachment and need for further surgery.   Retinal detachments can lead to vision loss despite surgery. This is a training facility and residents or fellows may be involved in aspects of your surgery while under my direct observation.  Patient agreed to proceed with surgery.       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 Almazan  - Pseudophacodonesis, stable for many years     # Cataract left eye   - Not VS    ~~~~~~~~~~~~~~~~~~~~~~~~~~~~~~~~~~   Complete documentation of historical and exam elements from today's encounter can be found in the full encounter summary report (not reduplicated in this progress note).  I personally obtained the chief complaint(s) and history of present illness.  I confirmed and edited as necessary the review of systems, past medical/surgical history, family history, social history, and examination findings as documented by others; and I examined the patient myself.  I personally reviewed the relevant tests, images, and reports as documented above.  I formulated and edited as necessary the assessment and plan and discussed the findings and management plan with the patient and family    Marine Lizarraga MD  Professor of Ophthalmology  Vitreo-Retinal surgeon   Department of Ophthalmology and Visual Neurosciences   [facility]  Phone: [phone]   Fax: [phone]

## 2025-03-26 NOTE — NURSING NOTE
Chief Complaints and History of Present Illnesses   Patient presents with    macular hole os      Chief Complaint(s) and History of Present Illness(es)       macular hole os                Comments    Vision stable ou near and far.     Denies flashes, floaters, curtain, pain.     Gtts: oxifloxacin os every day        Xiomara HUDSON, March 26, 2025 8:17 AM

## 2025-04-07 ENCOUNTER — OFFICE VISIT (OUTPATIENT)
Dept: FAMILY MEDICINE | Facility: CLINIC | Age: 25
End: 2025-04-07
Payer: COMMERCIAL

## 2025-04-07 VITALS
SYSTOLIC BLOOD PRESSURE: 129 MMHG | WEIGHT: 186 LBS | HEIGHT: 69 IN | TEMPERATURE: 97.6 F | BODY MASS INDEX: 27.55 KG/M2 | HEART RATE: 85 BPM | DIASTOLIC BLOOD PRESSURE: 85 MMHG | OXYGEN SATURATION: 100 % | RESPIRATION RATE: 20 BRPM

## 2025-04-07 DIAGNOSIS — Q89.8 STICKLER SYNDROME: ICD-10-CM

## 2025-04-07 DIAGNOSIS — Z01.818 PREOP GENERAL PHYSICAL EXAM: Primary | ICD-10-CM

## 2025-04-07 DIAGNOSIS — T85.398A EXTRUSION OF SCLERAL BUCKLE, INITIAL ENCOUNTER: ICD-10-CM

## 2025-04-07 DIAGNOSIS — Z23 NEED FOR TDAP VACCINATION: ICD-10-CM

## 2025-04-07 DIAGNOSIS — Z13.1 SCREENING FOR DIABETES MELLITUS: ICD-10-CM

## 2025-04-07 DIAGNOSIS — Z13.220 SCREENING FOR HYPERLIPIDEMIA: ICD-10-CM

## 2025-04-07 LAB
ANION GAP SERPL CALCULATED.3IONS-SCNC: 10 MMOL/L (ref 7–15)
BUN SERPL-MCNC: 7 MG/DL (ref 6–20)
CALCIUM SERPL-MCNC: 9.9 MG/DL (ref 8.8–10.4)
CHLORIDE SERPL-SCNC: 100 MMOL/L (ref 98–107)
CHOLEST SERPL-MCNC: 186 MG/DL
CREAT SERPL-MCNC: 0.85 MG/DL (ref 0.67–1.17)
EGFRCR SERPLBLD CKD-EPI 2021: >90 ML/MIN/1.73M2
ERYTHROCYTE [DISTWIDTH] IN BLOOD BY AUTOMATED COUNT: 12.7 % (ref 10–15)
FASTING STATUS PATIENT QL REPORTED: NORMAL
FASTING STATUS PATIENT QL REPORTED: NORMAL
GLUCOSE SERPL-MCNC: 96 MG/DL (ref 70–99)
HCO3 SERPL-SCNC: 27 MMOL/L (ref 22–29)
HCT VFR BLD AUTO: 45.9 % (ref 40–53)
HDLC SERPL-MCNC: 81 MG/DL
HGB BLD-MCNC: 15.3 G/DL (ref 13.3–17.7)
LDLC SERPL CALC-MCNC: 94 MG/DL
MCH RBC QN AUTO: 31.2 PG (ref 26.5–33)
MCHC RBC AUTO-ENTMCNC: 33.3 G/DL (ref 31.5–36.5)
MCV RBC AUTO: 94 FL (ref 78–100)
NONHDLC SERPL-MCNC: 105 MG/DL
PLATELET # BLD AUTO: 333 10E3/UL (ref 150–450)
POTASSIUM SERPL-SCNC: 4.6 MMOL/L (ref 3.4–5.3)
RBC # BLD AUTO: 4.91 10E6/UL (ref 4.4–5.9)
SODIUM SERPL-SCNC: 137 MMOL/L (ref 135–145)
TRIGL SERPL-MCNC: 56 MG/DL
WBC # BLD AUTO: 5.3 10E3/UL (ref 4–11)

## 2025-04-07 PROCEDURE — 80048 BASIC METABOLIC PNL TOTAL CA: CPT

## 2025-04-07 PROCEDURE — 99214 OFFICE O/P EST MOD 30 MIN: CPT | Mod: 25

## 2025-04-07 PROCEDURE — 90471 IMMUNIZATION ADMIN: CPT

## 2025-04-07 PROCEDURE — 85027 COMPLETE CBC AUTOMATED: CPT

## 2025-04-07 PROCEDURE — 36415 COLL VENOUS BLD VENIPUNCTURE: CPT

## 2025-04-07 PROCEDURE — 90715 TDAP VACCINE 7 YRS/> IM: CPT

## 2025-04-07 PROCEDURE — 80061 LIPID PANEL: CPT

## 2025-04-07 ASSESSMENT — PATIENT HEALTH QUESTIONNAIRE - PHQ9: 5. POOR APPETITE OR OVEREATING: NOT AT ALL

## 2025-04-07 ASSESSMENT — ANXIETY QUESTIONNAIRES
1. FEELING NERVOUS, ANXIOUS, OR ON EDGE: NOT AT ALL
6. BECOMING EASILY ANNOYED OR IRRITABLE: NOT AT ALL
7. FEELING AFRAID AS IF SOMETHING AWFUL MIGHT HAPPEN: NOT AT ALL
3. WORRYING TOO MUCH ABOUT DIFFERENT THINGS: NOT AT ALL
GAD7 TOTAL SCORE: 0
2. NOT BEING ABLE TO STOP OR CONTROL WORRYING: NOT AT ALL
GAD7 TOTAL SCORE: 0
IF YOU CHECKED OFF ANY PROBLEMS ON THIS QUESTIONNAIRE, HOW DIFFICULT HAVE THESE PROBLEMS MADE IT FOR YOU TO DO YOUR WORK, TAKE CARE OF THINGS AT HOME, OR GET ALONG WITH OTHER PEOPLE: NOT DIFFICULT AT ALL
5. BEING SO RESTLESS THAT IT IS HARD TO SIT STILL: NOT AT ALL

## 2025-04-07 NOTE — PROGRESS NOTES
Preoperative Evaluation  [facility]  [address] [facility] [place] [address]  Phone: [phone]  Primary Provider: Uriel Ortiz MD  Pre-op Performing Provider: Riya Clemente PA-C  Apr 7, 2025 4/7/2025   Surgical Information   What procedure is being done? scleral buckle removal Left eye   Facility or Hospital where procedure/surgery will be performed: [facility]   Who is doing the procedure / surgery? Dr. Lizarraga   Date of surgery / procedure: 4/15/2025   Time of surgery / procedure: Unknown   Where do you plan to recover after surgery? at home with family     Fax number for surgical facility: Note does not need to be faxed, will be available electronically in Epic.    Assessment & Plan     The proposed surgical procedure is considered LOW risk.    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 Scheduled for above listed procedure. Labs for screening and baseline levels obtained in clinic today. Exam stable. Approval given for the above listed procedure.     (Z13.1) Screening for diabetes mellitus  Plan: CBC with platelets, Basic metabolic panel  (Ca,        Cl, CO2, Creat, Gluc, K, Na, BUN)    (Z13.220) Screening for hyperlipidemia  Plan: Lipid panel reflex to direct LDL Fasting    (Z23) Need for Tdap vaccination  Plan: TDAP 10-64Y (ADACEL,BOOSTRIX)     - No identified additional risk factors other than previously addressed    Antiplatelet or Anticoagulation Medication Instructions   - We reviewed the medication list and the patient is not on an antiplatelet or anticoagulation medications.    Additional Medication Instructions  Take all scheduled medications on the day of surgery    Recommendation  Approval given to proceed with proposed procedure, without further diagnostic  evaluation.    Jae Jimenes is a 24 year old, presenting for the following:  Pre-Op Exam        4/7/2025    10:07 AM   Additional Questions   Roomed by Elena ROB: Scheduled for above listed procedure. No acute concerns at this time. No recent cardiac or pulmonary symptoms. No recent infectious symptom.         4/7/2025   Pre-Op Questionnaire   Have you ever had a heart attack or stroke? No   Have you ever had surgery on your heart or blood vessels, such as a stent placement, a coronary artery bypass, or surgery on an artery in your head, neck, heart, or legs? No   Do you have chest pain with activity? No   Do you have a history of heart failure? No   Do you currently have a cold, bronchitis or symptoms of other infection? No   Do you have a cough, shortness of breath, or wheezing? No   Do you or anyone in your family have previous history of blood clots? No   Do you or does anyone in your family have a serious bleeding problem such as prolonged bleeding following surgeries or cuts? No   Have you ever had problems with anemia or been told to take iron pills? No   Have you had any abnormal blood loss such as black, tarry or bloody stools? No   Have you ever had a blood transfusion? No   Are you willing to have a blood transfusion if it is medically needed before, during, or after your surgery? Yes   Have you or any of your relatives ever had problems with anesthesia? No   Do you have sleep apnea, excessive snoring or daytime drowsiness? No   Do you have any artifical heart valves or other implanted medical devices like a pacemaker, defibrillator, or continuous glucose monitor? No   Do you have artificial joints? No   Are you allergic to latex? No     Health Care Directive  Patient does not have a Health Care Directive: Discussed advance care planning with patient; however, patient declined at this time.    Preoperative Review of    reviewed - no record of controlled substances prescribed.    Status  of Chronic Conditions:  See problem list for active medical problems.  Problems all longstanding and stable, except as noted/documented.  See ROS for pertinent symptoms related to these conditions.    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     Priority: Medium    Posterior subcapsular polar age-related cataract of left eye 09/25/2023     Priority: Medium    Retained intraocular foreign body in anterior chamber of right eye 08/21/2023     Priority: Medium    Other specified postprocedural states 08/13/2021     Priority: Medium    Other specified congenital malformations 06/25/2021     Priority: Medium    Macular hole of left eye 04/28/2021     Priority: Medium     Added automatically from request for surgery [id number]      Left varicocele 06/26/2019     Priority: Medium    Dislocation, lens, anterior, right 12/13/2018     Priority: Medium    Acne vulgaris 03/29/2018     Priority: Medium    Short stature (child) 11/16/2016     Priority: Medium    Stickler syndrome 04/23/2012     Priority: Medium    Pertussis 06/24/2009     Priority: Medium    Other forms of retinal detachment(361.89) 12/12/2005     Priority: Medium      Past Medical History:   Diagnosis Date    Band-shaped keratopathy     RE    Glaucoma suspect     BE    Iritis     RE    Myopia     BE    RETINAL DETACHMENT NEC     BE    Staphyloma posticum     RE    Stickler's syndrome     Vitamin D deficiency 2019     Past Surgical History:   Procedure Laterality Date    EXAM UNDER ANESTHESIA, LASER DIODE RETINA, COMBINED  4/15/2008    laser retinopexy LE    RECONSTRUCT ANTERIOR CHAMBER Right 12/18/2018    Procedure: Right Eye Anterior Chamber Wash Out and Lens Fragment Removal;  Surgeon: Norah  Tutu Page MD;  Location: [facility] OR    RECONSTRUCT ANTERIOR CHAMBER Right 9/5/2023    Procedure: Anterior chamber washout right eye;  Surgeon: Marine Lizarraga MD;  Location: [facility] OR    RETINAL REATTACHMENT      SCLERAL BUCKLE  1/15/2014    LE    STRABISMUS SURGERY  8/13/2009    Bilateral medial rectus recession, bilateral inferior oblique myectomy    VITRECTOMY PARSPLANA  5/17/2005    PPV, EL, SO RE    VITRECTOMY PARSPLANA  12/27/2005    PPV, PPL, SO removal RE    VITRECTOMY PARSPLANA  3/17/2010    PPV, EL, MP, PI RE    VITRECTOMY PARSPLANA  8/11/2010    PPV, EL, SO removal RE     Current Outpatient Medications   Medication Sig Dispense Refill    finasteride (PROSCAR) 5 MG tablet Take by mouth every morning 1/4 tablet every morning      minoxidil (LONITEN) 2.5 MG tablet Take 2.5 mg by mouth daily.      multivitamin w/minerals (MULTI-VITAMIN) tablet Take 1 tablet by mouth daily      ofloxacin (OCUFLOX) 0.3 % ophthalmic solution Place 1-2 drops Into the left eye every morning. 10 mL 1    clindamycin (CLEOCIN T) 1 % external lotion Apply topically as needed      neomycin-polymyxin-dexAMETHasone (MAXITROL) 3.5-65826-4.1 ophthalmic ointment 0.5 inches 4 times daily         No Known Allergies     Social History     Tobacco Use    Smoking status: Never     Passive exposure: Never    Smokeless tobacco: Never    Tobacco comments:     No one in family smokes.   Substance Use Topics    Alcohol use: No     Family History   Problem Relation Age of Onset    Family History Negative Mother     Family History Negative Father     Retinal detachment Brother     Glaucoma No family hx of     Macular Degeneration No family hx of     Anesthesia Reaction No family hx of     Deep Vein Thrombosis (DVT) No family hx of      History   Drug Use No             Review of Systems  Constitutional, HEENT, cardiovascular, pulmonary, GI, , musculoskeletal, neuro, skin, endocrine and psych systems are negative, except as otherwise  "noted.    Objective    /85 (BP Location: Right arm, Patient Position: Sitting, Cuff Size: Adult Large)   Pulse 85   Temp 97.6  F (36.4  C) (Oral)   Resp 20   Ht 1.753 m (5' 9\")   Wt 84.4 kg (186 lb)   SpO2 100%   BMI 27.47 kg/m     Estimated body mass index is 27.47 kg/m  as calculated from the following:    Height as of this encounter: 1.753 m (5' 9\").    Weight as of this encounter: 84.4 kg (186 lb).  Physical Exam  GENERAL: alert and no distress  EYES: No acute concerns.  HENT: ear canals and TM's normal, nose and mouth without ulcers or lesions  NECK: no adenopathy, no asymmetry, masses, or scars  RESP: lungs clear to auscultation - no rales, rhonchi or wheezes  CV: regular rate and rhythm, normal S1 S2, no S3 or S4, no murmur  MS: no gross musculoskeletal defects noted  SKIN: no suspicious lesions or rashes  NEURO: Normal strength and tone, mentation intact and speech normal  PSYCH: mentation appears normal, affect normal/bright    No results for input(s): \"HGB\", \"PLT\", \"INR\", \"NA\", \"POTASSIUM\", \"CR\", \"A1C\" in the last 8760 hours.     Diagnostics  Labs pending at this time.  Results will be reviewed when available.   No EKG required, no history of coronary heart disease, significant arrhythmia, peripheral arterial disease or other structural heart disease.    Revised Cardiac Risk Index (RCRI)  The patient has the following serious cardiovascular risks for perioperative complications:   - No serious cardiac risks = 0 points     RCRI Interpretation: 0 points: Class I (very low risk - 0.4% complication rate)         Signed Electronically by: Riya Clemente PA-C  A copy of this evaluation report is provided to the requesting physician.    "

## 2025-04-07 NOTE — PATIENT INSTRUCTIONS
Patient Education   Preparing for Your Surgery  For Adults  Getting started  In most cases, a nurse will call to review your health history and instructions. They will give you an arrival time based on your scheduled surgery time. Please be ready to share:  Your doctor's clinic name and phone number  Your medical, surgical, and anesthesia history  A list of allergies and sensitivities  A list of medicines, including herbal treatments and over-the-counter drugs  Whether the patient has a legal guardian (ask how to send us the papers in advance)  Note: You may not receive a call if you were seen at our PAC (Preoperative Assessment Center).  Please tell us if you're pregnant--or if there's any chance you might be pregnant. Some surgeries may injure a fetus (unborn baby), so they require a pregnancy test. Surgeries that are safe for a fetus don't always need a test, and you can choose whether to have one.   Preparing for surgery  Within 10 to 30 days of surgery: Have a pre-op exam (sometimes called an H&P, or History and Physical). This can be done at a clinic or pre-operative center.  If you're having a , you may not need this exam. Talk to your care team.  At your pre-op exam, talk to your care team about all medicines you take. (This includes CBD oil and any drugs, such as THC, marijuana, and other forms of cannabis.) If you need to stop any medicine before surgery, ask when to start taking it again.  This is for your safety. Many medicines and drugs can make you bleed too much during surgery. Some change how well surgery (anesthesia) drugs work.  Call your insurance company to let them know you're having surgery. (If you don't have insurance, call [phone].)  Call your clinic if there's any change in your health. This includes a scrape or scratch near the surgery site, or any signs of a cold (sore throat, runny nose, cough, rash, fever).  Eating and drinking guidelines  For your safety: Unless your  surgeon tells you otherwise, follow the guidelines below.  Eat and drink as normal until 8 hours before you arrive for surgery. After that, no food or milk. You can spit out gum when you arrive.  Drink clear liquids until 2 hours before you arrive. These are liquids you can see through, like water, Gatorade, and Propel Water. They also include plain black coffee and tea (no cream or milk).  No alcohol for 24 hours before you arrive. The night before surgery, stop any drinks that contain THC.  If your care team tells you to take medicine on the morning of surgery, it's okay to take it with a sip of water. No other medicines or drugs are allowed (including CBD oil)--follow your care team's instructions.  If you have questions the day of surgery, call your hospital or surgery center.   Preventing infection  Shower or bathe the night before and the morning of surgery. Follow the instructions your clinic gave you. (If no instructions, use regular soap.)  Don't shave or clip hair near your surgery site. We'll remove the hair if needed.  Don't smoke or vape the morning of surgery. No chewing tobacco for 6 hours before you arrive. A nicotine patch is okay. You may spit out nicotine gum when you arrive.  For some surgeries, the surgeon will tell you to fully quit smoking and nicotine.  We will make every effort to keep you safe from infection. We will:  Clean our hands often with soap and water (or an alcohol-based hand rub).  Clean the skin at your surgery site with a special soap that kills germs.  Give you a special gown to keep you warm. (Cold raises the risk of infection.)  Wear hair covers, masks, gowns, and gloves during surgery.  Give antibiotic medicine, if prescribed. Not all surgeries need this medicine.  What to bring on the day of surgery  Photo ID and insurance card  Copy of your health care directive, if you have one  Glasses and hearing aids (bring cases)  You can't wear contacts during surgery  Inhaler and  eye drops, if you use them (tell us about these when you arrive)  CPAP machine or breathing device, if you use them  A few personal items, if spending the night  If you have . . .  A pacemaker, ICD (cardiac defibrillator), or other implant: Bring the ID card.  An implanted stimulator: Bring the remote control.  A legal guardian: Bring a copy of the certified (court-stamped) guardianship papers.  Please remove any jewelry, including body piercings. Leave jewelry and other valuables at home.  If you're going home the day of surgery  You must have a responsible adult drive you home. They should stay with you overnight as well.  If you don't have someone to stay with you, and you aren't safe to go home alone, we may keep you overnight. Insurance often won't pay for this.  After surgery  If it's hard to control your pain or you need more pain medicine, please call your surgeon's office.  Questions?   If you have any questions for your care team, list them here:   ____________________________________________________________________________________________________________________________________________________________________________________________________________________________________________________________  For informational purposes only. Not to replace the advice of your health care provider. Copyright   2003, 2019 [employer]. All rights reserved. Clinically reviewed by Graham Cheney MD. [employer] [id number] - REV 08/24.

## 2025-04-14 ENCOUNTER — ANESTHESIA EVENT (OUTPATIENT)
Dept: SURGERY | Facility: AMBULATORY SURGERY CENTER | Age: 25
End: 2025-04-14
Payer: COMMERCIAL

## 2025-04-15 ENCOUNTER — HOSPITAL ENCOUNTER (OUTPATIENT)
Facility: AMBULATORY SURGERY CENTER | Age: 25
Discharge: HOME | End: 2025-04-15
Attending: OPHTHALMOLOGY
Payer: COMMERCIAL

## 2025-04-15 ENCOUNTER — ANESTHESIA (OUTPATIENT)
Dept: SURGERY | Facility: AMBULATORY SURGERY CENTER | Age: 25
End: 2025-04-15
Payer: COMMERCIAL

## 2025-04-15 VITALS
HEIGHT: 69 IN | TEMPERATURE: 97.8 F | BODY MASS INDEX: 27.55 KG/M2 | WEIGHT: 186 LBS | OXYGEN SATURATION: 100 % | SYSTOLIC BLOOD PRESSURE: 131 MMHG | HEART RATE: 57 BPM | RESPIRATION RATE: 16 BRPM | DIASTOLIC BLOOD PRESSURE: 72 MMHG

## 2025-04-15 DIAGNOSIS — T85.398A EXTRUSION OF SCLERAL BUCKLE, INITIAL ENCOUNTER: Primary | ICD-10-CM

## 2025-04-15 RX ORDER — ONDANSETRON 4 MG/1
4 TABLET, ORALLY DISINTEGRATING ORAL EVERY 30 MIN PRN
Status: DISCONTINUED | OUTPATIENT
Start: 2025-04-15 | End: 2025-04-16 | Stop reason: HOSPADM

## 2025-04-15 RX ORDER — SODIUM CHLORIDE, SODIUM LACTATE, POTASSIUM CHLORIDE, CALCIUM CHLORIDE 600; 310; 30; 20 MG/100ML; MG/100ML; MG/100ML; MG/100ML
INJECTION, SOLUTION INTRAVENOUS CONTINUOUS
Status: DISCONTINUED | OUTPATIENT
Start: 2025-04-15 | End: 2025-04-16 | Stop reason: HOSPADM

## 2025-04-15 RX ORDER — DEXAMETHASONE SODIUM PHOSPHATE 4 MG/ML
INJECTION, SOLUTION INTRA-ARTICULAR; INTRALESIONAL; INTRAMUSCULAR; INTRAVENOUS; SOFT TISSUE PRN
Status: DISCONTINUED | OUTPATIENT
Start: 2025-04-15 | End: 2025-04-15 | Stop reason: HOSPADM

## 2025-04-15 RX ORDER — DEXAMETHASONE SODIUM PHOSPHATE 10 MG/ML
4 INJECTION, SOLUTION INTRAMUSCULAR; INTRAVENOUS
Status: DISCONTINUED | OUTPATIENT
Start: 2025-04-15 | End: 2025-04-16 | Stop reason: HOSPADM

## 2025-04-15 RX ORDER — BALANCED SALT SOLUTION 6.4; .75; .48; .3; 3.9; 1.7 MG/ML; MG/ML; MG/ML; MG/ML; MG/ML; MG/ML
SOLUTION OPHTHALMIC PRN
Status: DISCONTINUED | OUTPATIENT
Start: 2025-04-15 | End: 2025-04-15 | Stop reason: HOSPADM

## 2025-04-15 RX ORDER — OFLOXACIN 3 MG/ML
1 SOLUTION/ DROPS OPHTHALMIC 4 TIMES DAILY
Qty: 5 ML | Refills: 0 | Status: SHIPPED | OUTPATIENT
Start: 2025-04-15

## 2025-04-15 RX ORDER — PROPOFOL 10 MG/ML
INJECTION, EMULSION INTRAVENOUS PRN
Status: DISCONTINUED | OUTPATIENT
Start: 2025-04-15 | End: 2025-04-15

## 2025-04-15 RX ORDER — OXYCODONE HYDROCHLORIDE 5 MG/1
10 TABLET ORAL
Status: DISCONTINUED | OUTPATIENT
Start: 2025-04-15 | End: 2025-04-16 | Stop reason: HOSPADM

## 2025-04-15 RX ORDER — PROPARACAINE HYDROCHLORIDE 5 MG/ML
1 SOLUTION/ DROPS OPHTHALMIC ONCE
Status: COMPLETED | OUTPATIENT
Start: 2025-04-15 | End: 2025-04-15

## 2025-04-15 RX ORDER — FENTANYL CITRATE 50 UG/ML
25 INJECTION, SOLUTION INTRAMUSCULAR; INTRAVENOUS EVERY 5 MIN PRN
Status: DISCONTINUED | OUTPATIENT
Start: 2025-04-15 | End: 2025-04-16 | Stop reason: HOSPADM

## 2025-04-15 RX ORDER — NALOXONE HYDROCHLORIDE 0.4 MG/ML
0.1 INJECTION, SOLUTION INTRAMUSCULAR; INTRAVENOUS; SUBCUTANEOUS
Status: DISCONTINUED | OUTPATIENT
Start: 2025-04-15 | End: 2025-04-16 | Stop reason: HOSPADM

## 2025-04-15 RX ORDER — OXYCODONE HYDROCHLORIDE 5 MG/1
5 TABLET ORAL
Status: DISCONTINUED | OUTPATIENT
Start: 2025-04-15 | End: 2025-04-16 | Stop reason: HOSPADM

## 2025-04-15 RX ORDER — FENTANYL CITRATE 50 UG/ML
50 INJECTION, SOLUTION INTRAMUSCULAR; INTRAVENOUS EVERY 5 MIN PRN
Status: DISCONTINUED | OUTPATIENT
Start: 2025-04-15 | End: 2025-04-16 | Stop reason: HOSPADM

## 2025-04-15 RX ORDER — LIDOCAINE HYDROCHLORIDE 20 MG/ML
INJECTION, SOLUTION INFILTRATION; PERINEURAL PRN
Status: DISCONTINUED | OUTPATIENT
Start: 2025-04-15 | End: 2025-04-15

## 2025-04-15 RX ORDER — CYCLOPENTOLAT/TROPIC/PHENYLEPH 1%-1%-2.5%
1 DROPS (EA) OPHTHALMIC (EYE)
Status: COMPLETED | OUTPATIENT
Start: 2025-04-15 | End: 2025-04-15

## 2025-04-15 RX ORDER — ONDANSETRON 2 MG/ML
4 INJECTION INTRAMUSCULAR; INTRAVENOUS EVERY 30 MIN PRN
Status: DISCONTINUED | OUTPATIENT
Start: 2025-04-15 | End: 2025-04-16 | Stop reason: HOSPADM

## 2025-04-15 RX ORDER — HYDROMORPHONE HYDROCHLORIDE 1 MG/ML
0.2 INJECTION, SOLUTION INTRAMUSCULAR; INTRAVENOUS; SUBCUTANEOUS EVERY 5 MIN PRN
Status: DISCONTINUED | OUTPATIENT
Start: 2025-04-15 | End: 2025-04-16 | Stop reason: HOSPADM

## 2025-04-15 RX ORDER — ACETAMINOPHEN 325 MG/1
975 TABLET ORAL ONCE
Status: COMPLETED | OUTPATIENT
Start: 2025-04-15 | End: 2025-04-15

## 2025-04-15 RX ORDER — PREDNISOLONE ACETATE 10 MG/ML
1 SUSPENSION/ DROPS OPHTHALMIC 4 TIMES DAILY
Qty: 5 ML | Refills: 1 | Status: SHIPPED | OUTPATIENT
Start: 2025-04-15

## 2025-04-15 RX ORDER — LIDOCAINE 40 MG/G
CREAM TOPICAL
Status: DISCONTINUED | OUTPATIENT
Start: 2025-04-15 | End: 2025-04-16 | Stop reason: HOSPADM

## 2025-04-15 RX ORDER — TETRACAINE HYDROCHLORIDE 5 MG/ML
SOLUTION OPHTHALMIC PRN
Status: DISCONTINUED | OUTPATIENT
Start: 2025-04-15 | End: 2025-04-15 | Stop reason: HOSPADM

## 2025-04-15 RX ORDER — HYDROMORPHONE HYDROCHLORIDE 1 MG/ML
0.4 INJECTION, SOLUTION INTRAMUSCULAR; INTRAVENOUS; SUBCUTANEOUS EVERY 5 MIN PRN
Status: DISCONTINUED | OUTPATIENT
Start: 2025-04-15 | End: 2025-04-16 | Stop reason: HOSPADM

## 2025-04-15 RX ORDER — ONDANSETRON 2 MG/ML
INJECTION INTRAMUSCULAR; INTRAVENOUS PRN
Status: DISCONTINUED | OUTPATIENT
Start: 2025-04-15 | End: 2025-04-15

## 2025-04-15 RX ADMIN — PROPOFOL 50 MG: 10 INJECTION, EMULSION INTRAVENOUS at 07:52

## 2025-04-15 RX ADMIN — Medication 1 DROP: at 06:45

## 2025-04-15 RX ADMIN — SODIUM CHLORIDE, SODIUM LACTATE, POTASSIUM CHLORIDE, CALCIUM CHLORIDE: 600; 310; 30; 20 INJECTION, SOLUTION INTRAVENOUS at 07:02

## 2025-04-15 RX ADMIN — LIDOCAINE HYDROCHLORIDE 40 MG: 20 INJECTION, SOLUTION INFILTRATION; PERINEURAL at 07:50

## 2025-04-15 RX ADMIN — Medication 1 DROP: at 06:54

## 2025-04-15 RX ADMIN — ACETAMINOPHEN 975 MG: 325 TABLET ORAL at 06:48

## 2025-04-15 RX ADMIN — PROPARACAINE HYDROCHLORIDE 1 DROP: 5 SOLUTION/ DROPS OPHTHALMIC at 06:37

## 2025-04-15 RX ADMIN — ONDANSETRON 4 MG: 2 INJECTION INTRAMUSCULAR; INTRAVENOUS at 07:48

## 2025-04-15 RX ADMIN — Medication 1 DROP: at 06:49

## 2025-04-15 RX ADMIN — PROPOFOL 50 MG: 10 INJECTION, EMULSION INTRAVENOUS at 07:51

## 2025-04-15 NOTE — OP NOTE
SURGEON:      Marine Lizarraga MD  ASSISTANT SURGEON:    Galo Gusman MD   PREOPERATIVE DIAGNOSES:    Scleral buckle extrusion, left eye  POSTOPERATIVE DIAGNOSIS:   Same  OPERATION PERFORMED:     1.  Scleral buckle removal, left eye    ANESTHESIA:     MAC+peribulbar  BLOOD LOSS:      Minimal  COMPLICATIONS:     None   SPECIMENS:     None  IMPLANT: * No implants in log *   FINDINGS: scleral buckle band and sleeve removed in their entirety from superonasal area of exposure; area of exposure with light white discharge; sutured shut and placed subconjunctival ancef     INDICATIONS: Jalil Hernandez is a 24 year old male presenting for the procedures listed above. Of note, he has a history of Sticklers Syndrome and many previous surgeries.     DESCRIPTION OF PROCEDURE:    The patient was were dilated in the preoperative area and taken to the operating room where MAC was administered. A peribulbar block consisting of a 1:1 mixture of 2% lidocaine and 0.75% marcaine with epinephrine and wydase, was administered to the operative eye with adequate anesthesia and akinesia. The eye was prepped and draped in the standard ophthalmic fashion with 5% povidone iodine. A timeout was performed. The microscope was brought into place and the site of exposure was visualized. The discharge was removed with a cotton tip applicator. A small incision was made in the capsule surrounding the band. The band was grasped with 0.12 forceps and cut with Rachel scissors. The sleeve and band complex were grabbed with a 0.5 forceps and was removed from the eye with minimal resistance. The area of conjunctival exposure was sutured with 6-0 plain gut. The fundus was examined with indirect ophthalmoscopy and appeared stable without new retinal tears or detachment. A subconjunctival injection of dexamethasone 4mg/mL and cefazolin 100mg/mL was administered in the area of the exposure. The drapes were removed and maxitrol ointment and atropine were  administered. The eye was patched and shielded. The patient was transferred to PACU without complications.     The surgery was assisted by Galo Gusman MD. Due to the delicate and complex nature of this surgery their assistance was required. I was present for the entire surgery. He performed the scleral buckle removal.

## 2025-04-15 NOTE — DISCHARGE INSTRUCTIONS
POST-OPERATIVE INSTRUCTIONS FOLLOWING RETINA SURGERY    Department of Ophthalmology  [facility]  [phone]    ACTIVITY:  No heavy lifting for 2 weeks after surgery.  No swimming for 4 weeks after surgery.  It is OK for you to shower, to wash your face, and to wash your hair.  Allow the shower to hit the top of your head and wash down your face.  Do not hit your eye directly with the jet from the shower.  Keep your eye covered with the shield when you sleep for 1 week after surgery.  If your shield has a tab, it is designed to go over the bridge of your nose.  Place one piece of tape diagonally from the center of your forehead to the side of your cheek to secure the shield.   During the daytime, you can use either the shield or your regular eyeglasses or sunglasses to protect your eye.    EYE DROPS:  Use these drops after surgery.  When using more than one drop, separate them by 3 minutes between drops.  Common times to place drops are breakfast, lunch, dinner, and bedtime.  Do not stop your drops without discussing with our office.  If you run out before your appointment, call and we will send in a refill.  Prednisolone - 4 times per day (pink top)  Ofloxacin - 4 times per day (tan top)    WHAT TO EXPECT:  It is common for the eye to to have a blood tinged discharge for a few days after surgery  It may feel irritated (as if something were in your eye), for there to be clear discharge (thicker in the mornings upon awakening), and for it to be bloodshot for 2-3 weeks following retina surgery  You might feel itchiness, please do not rub your eye, instead it is OK to use artificial tears to minimize symptoms  Your vision is will be decreased during this time due to the bubble    WHAT TO WATCH OUT FOR:  If you experience any of the following, you should call immediately:  Increasing pain  Increasing nausea or vomiting  Increasing redness  Worsening or darkening of the vision  New flashing lights or  floaters    For any of the symptoms listed above, or for other concerns, call [phone] and ask to speak to the clinic nurse.  If you call after hours, follow to prompts to reach the doctor on call.            [facility]  Home Care Following Anesthesia  For 24 hours after surgery:  Get plenty of rest.  A responsible adult must stay with you for at least 24 hours after you leave the surgery center.  Do not drive or use heavy equipment.  If you have weakness or tingling, don't drive or use heavy equipment until this feeling goes away.   Do not drink alcohol.   Avoid strenuous or risky activities.  Ask for help when climbing stairs.  You may feel lightheaded.  IF so, sit for a few minutes before standing.  Have someone help you get up.   If you have nausea (feel sick to your stomach): Drink only clear liquids such as apple juice, ginger ale, broth or 7-Up.  Rest may also help.  Be sure to drink enough fluids.  Move to a regular diet as you feel able.   You may have a slight fever.  Call the doctor if your fever is over 100 F (37.7 C) (taken under the tongue) or lasts longer than 24 hours.  Do not make important or legal decisions.   It is recommended to avoid smoking.                   Tylenol/Acetaminophen Consumption    If you feel your pain relief is insufficient, you may take Tylenol/Acetaminophen in addition to your narcotic pain medication.   Be careful not to exceed 4,000 mg of Tylenol/Acetaminophen in a 24 hour period from all sources.  If you are taking extra strength Tylenol/acetaminophen (500 mg), the maximum dose is 8 tablets in 24 hours.  If you are taking regular strength acetaminophen (325 mg), the maximum dose is 12 tablets in 24 hours.    Call a doctor for any of the following:  Signs of infection (fever, growing tenderness at the surgery site, a large amount of drainage or bleeding, severe pain, foul-smelling drainage, redness, swelling).  It has been over 8  to 10 hours since surgery and you are still not able to urinate (pass water).  Headache for over 24 hours.      Your doctor is:       Dr. Marine Lizarraga, Ophthalmology: [phone]  (Monday-Friday 8 am to 4 pm)             After hours and weekends call the hospital @ [phone] and ask for the resident on call for:  Ophthalmology  For emergency care, call the:  [facility]:  [phone] (TTY for hearing impaired: [phone])

## 2025-04-15 NOTE — BRIEF OP NOTE
[facility]    Brief Operative Note    Pre-operative diagnosis: Extrusion of scleral buckle, initial encounter [T85.398A]  Post-operative diagnosis Same as pre-operative diagnosis    Procedure: REMOVAL, LEFT SCLERAL BUCKLE, Left - Eye    Surgeon: Surgeons and Role:     * Marine Lizarraga MD - Primary     * Galo Gusman MD - Fellow - Assisting  Anesthesia: MAC with Block   Estimated Blood Loss: Minimal    Drains: None  Specimens: * No specimens in log *  Findings:   None.  Complications: None.  Implants: * No implants in log *

## 2025-04-15 NOTE — ANESTHESIA POSTPROCEDURE EVALUATION
Patient: Jalil Hernandez    Procedure: Procedure(s):  REMOVAL, LEFT SCLERAL BUCKLE       Anesthesia Type:  MAC    Note:  Disposition: Outpatient   Postop Pain Control: Uneventful            Sign Out: Well controlled pain   PONV: No   Neuro/Psych: Uneventful            Sign Out: Acceptable/Baseline neuro status   Airway/Respiratory: Uneventful            Sign Out: Acceptable/Baseline resp. status   CV/Hemodynamics: Uneventful            Sign Out: Acceptable CV status; No obvious hypovolemia; No obvious fluid overload   Other NRE: NONE   DID A NON-ROUTINE EVENT OCCUR?            Last vitals:  Vitals Value Taken Time   /72 04/15/25 0845   Temp 36.6  C (97.8  F) 04/15/25 0850   Pulse 57 04/15/25 0845   Resp 16 04/15/25 0850   SpO2 100 % 04/15/25 0853   Vitals shown include unfiled device data.    Electronically Signed By: Eddie Mccain MD  April 15, 2025  12:59 PM

## 2025-04-15 NOTE — ANESTHESIA CARE TRANSFER NOTE
Patient: Jalil Hernandez    Procedure: Procedure(s):  REMOVAL, LEFT SCLERAL BUCKLE       Diagnosis: Extrusion of scleral buckle, initial encounter [T85.398A]  Diagnosis Additional Information: No value filed.    Anesthesia Type:   MAC     Note:    Oropharynx: oropharynx clear of all foreign objects and spontaneously breathing  Level of Consciousness: awake  Oxygen Supplementation: room air  Level of Supplemental Oxygen (L/min / FiO2): 0  Independent Airway: airway patency satisfactory and stable  Dentition: dentition unchanged  Vital Signs Stable: post-procedure vital signs reviewed and stable  Report to RN Given: handoff report given  Patient transferred to: Phase II  Comments: Uneventful transport   Report to RN - Sonia  Pt comfortable  Exchanging well; color natl  Pt responds appropriately to command  IV patent  Lips/teeth/dentition as preop status  Questions answered      Handoff Report: Identifed the Patient, Identified the Reponsible Provider, Reviewed the pertinent medical history, Discussed the surgical course, Reviewed Intra-OP anesthesia mangement and issues during anesthesia, Set expectations for post-procedure period and Allowed opportunity for questions and acknowledgement of understanding      Vitals:  Vitals Value Taken Time   /104 04/15/25 0823   Temp 97.6    Pulse 72 04/15/25 0823   Resp 16    SpO2 98 % 04/15/25 0824   Vitals shown include unfiled device data.    Electronically Signed By: MATTHEW GUZMAN CRNA  April 15, 2025  8:26 AM

## 2025-04-15 NOTE — ANESTHESIA PREPROCEDURE EVALUATION
Anesthesia Pre-Procedure Evaluation    Patient: aJlil Hernandez   MRN: [id number] : 2000        Procedure : Procedure(s):  REMOVAL, LEFT SCLERAL BUCKLE          Past Medical History:   Diagnosis Date    Band-shaped keratopathy     RE    Glaucoma suspect     BE    Iritis     RE    Myopia     BE    RETINAL DETACHMENT NEC     BE    Staphyloma posticum     RE    Stickler's syndrome     Vitamin D deficiency       Past Surgical History:   Procedure Laterality Date    EXAM UNDER ANESTHESIA, LASER DIODE RETINA, COMBINED  4/15/2008    laser retinopexy LE    RECONSTRUCT ANTERIOR CHAMBER Right 2018    Procedure: Right Eye Anterior Chamber Wash Out and Lens Fragment Removal;  Surgeon: Tutu Almazan MD;  Location:  OR    RECONSTRUCT ANTERIOR CHAMBER Right 2023    Procedure: Anterior chamber washout right eye;  Surgeon: Marine Lizarraga MD;  [place] [facility] [place]    [place]INAL REATTACHMENT      SCLERAL BUCKLE  1/15/2014    LE    STRABISMUS SURGERY  2009    Bilateral medial rectus recession, bilateral inferior oblique myectomy    VITRECTOMY PARSPLANA  2005    PPV, EL, SO RE    VITRECTOMY PARSPLANA  2005    PPV, PPL, SO removal RE    VITRECTOMY PARSPLANA  3/17/2010    PPV, EL, MP, PI RE    VITRECTOMY PARSPLANA  2010    PPV, EL, SO removal RE      No Known Allergies   Social History     Tobacco Use    Smoking status: Never     Passive exposure: Never    Smokeless tobacco: Never    Tobacco comments:     No one in family smokes.   Substance Use Topics    Alcohol use: No      Wt Readings from Last 1 Encounters:   25 84.4 kg (186 lb)        Anesthesia Evaluation   Pt has had prior anesthetic.     No history of anesthetic complications       ROS/MED HX  ENT/Pulmonary: Comment: RETINAL DETACHMENT NEC Band-shaped keratopathy    Band keratopathy of right eye  Dislocation, lens, anterior, right  Extrusion of scleral buckle, initial encounter          Neurologic:  - neg neurologic ROS      Cardiovascular:  - neg cardiovascular ROS     METS/Exercise Tolerance: >4 METS    Hematologic:  - neg hematologic  ROS     Musculoskeletal:  - neg musculoskeletal ROS     GI/Hepatic:  - neg GI/hepatic ROS     Renal/Genitourinary:  - neg Renal ROS     Endo:  - neg endo ROS     Psychiatric/Substance Use:  - neg psychiatric ROS     Infectious Disease:  - neg infectious disease ROS     Malignancy:  - neg malignancy ROS     Other:  - neg other ROS          Physical Exam    Airway  airway exam normal      Mallampati: I   TM distance: > 3 FB   Neck ROM: full   Mouth opening: > 3 cm    Respiratory Devices and Support         Dental       (+) Completely normal teeth      Cardiovascular   cardiovascular exam normal       Rhythm and rate: regular and normal     Pulmonary   pulmonary exam normal        breath sounds clear to auscultation       Other findings: [unfilled]     OUTSIDE LABS:  CBC:   Lab Results   Component Value Date    WBC 5.3 04/07/2025    WBC 7.1 05/31/2022    HGB 15.3 04/07/2025    HGB 14.7 05/31/2022    HCT 45.9 04/07/2025    HCT 44.5 05/31/2022     04/07/2025     05/31/2022     BMP:   Lab Results   Component Value Date     04/07/2025     05/31/2022    POTASSIUM 4.6 04/07/2025    POTASSIUM 3.7 05/31/2022    CHLORIDE 100 04/07/2025    CHLORIDE 105 05/31/2022    CO2 27 04/07/2025    CO2 28 05/31/2022    BUN 7.0 04/07/2025    BUN 13 05/31/2022    CR 0.85 04/07/2025    CR 0.75 05/31/2022    GLC 96 04/07/2025    GLC 90 05/31/2022     COAGS:   Lab Results   Component Value Date    PTT 30 04/26/2012    INR 1.18 (H) 04/26/2012     POC:   Lab Results   Component Value Date    BGM 79 12/27/2016     HEPATIC:   Lab Results   Component Value Date    ALBUMIN 4.1 10/05/2021    PROTTOTAL 8.1 10/05/2021    ALT 17 10/05/2021    AST 15 10/05/2021    ALKPHOS 89 10/05/2021    BILITOTAL 0.5 10/05/2021     OTHER:   Lab Results   Component Value Date    JONATHAN 9.9 04/07/2025    TSH 2.13 10/05/2021    T4  "1.17 11/19/2008    T3 128 11/19/2008    CRP <2.9 08/31/2016    SED 13 08/31/2016       Anesthesia Plan    ASA Status:  2    NPO Status:  NPO Appropriate    Anesthesia Type: MAC.     - Reason for MAC: straight local not clinically adequate   Induction: Propofol.   Maintenance: N/A.        Consents    Anesthesia Plan(s) and associated risks, benefits, and realistic alternatives discussed. Questions answered and patient/representative(s) expressed understanding.     - Discussed:     - Discussed with:  Patient       Use of blood products discussed: No .     Postoperative Care    Pain management: Multi-modal analgesia, Oral pain medications.   PONV prophylaxis: Ondansetron (or other 5HT-3), Dexamethasone or Solumedrol     Comments:               Eddie Mccain MD    Clinically Significant Risk Factors Present on Admission                             # Overweight: Estimated body mass index is 27.47 kg/m  as calculated from the following:    Height as of 4/7/25: 1.753 m (5' 9\").    Weight as of 4/7/25: 84.4 kg (186 lb).                "

## 2025-04-16 ENCOUNTER — OFFICE VISIT (OUTPATIENT)
Dept: OPHTHALMOLOGY | Facility: CLINIC | Age: 25
End: 2025-04-16
Attending: OPHTHALMOLOGY
Payer: COMMERCIAL

## 2025-04-16 DIAGNOSIS — Z48.810 AFTERCARE FOLLOWING SURGERY OF A SENSORY ORGAN: Primary | ICD-10-CM

## 2025-04-16 PROCEDURE — G0463 HOSPITAL OUTPT CLINIC VISIT: HCPCS | Performed by: OPHTHALMOLOGY

## 2025-04-16 ASSESSMENT — REFRACTION_WEARINGRX
OD_CYLINDER: +1.25
OS_CYLINDER: SPHERE
OS_SPHERE: -12.50
OD_SPHERE: -3.00
OD_AXIS: 103

## 2025-04-16 ASSESSMENT — TONOMETRY
OD_IOP_MMHG: 16
OS_IOP_MMHG: 22
IOP_METHOD: ICARE
OS_IOP_MMHG: 19
IOP_METHOD: ICARE

## 2025-04-16 ASSESSMENT — VISUAL ACUITY
OS_CC: 20/60
OD_CC: 20/60
CORRECTION_TYPE: GLASSES
OS_CC+: -1
METHOD: SNELLEN - LINEAR

## 2025-04-16 ASSESSMENT — SLIT LAMP EXAM - LIDS: COMMENTS: NORMAL

## 2025-04-16 ASSESSMENT — EXTERNAL EXAM - LEFT EYE: OS_EXAM: NORMAL

## 2025-04-16 ASSESSMENT — EXTERNAL EXAM - RIGHT EYE: OD_EXAM: NORMAL

## 2025-04-16 NOTE — PROGRESS NOTES
"CC: POD1 S/P Buckle removal left eye     Interval: Patient reports that he had not pain, no foreign body sensation in the left eye. Doing well.       History of Present Illness:   Jalil Hernandez is a 24 year old patient referred by Dr Mcguire for left eye exposed buckle. He was originally seen by Dr Mcguire for evaluation of ocular hypertension. 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   He is being see by Dr. Lizarraga routinely and next appt is 5/18/25.  Jalil says that Dr. Lizarraga noted small exposure of scleral buckle left eye before and was watching it. He has not had any issues with it and when he looks in the mirror, he doesn't see any changes.     Relevant Past Medical/Family/Social History:  Noncontributory     Relevant Review of Systems: noncontributory      OCT 03/26/25   Right eye retina attached with remnants of PHF with tractions around macula and subhyaloid emulsified silicone oil  Left eye retina attached; irregular inner retina; subfoveal EZ disruption      A/P:   # Stickler's syndrome  Retina attached both eyes     #POD1 S/P removal of Scleral buckle left eye     Slept well  Retina attached  Doing well    Plan:  Position: As desired  No heavy lifting   Pappas shield at all times    Medications to operative eye  Predforte  (pink top) four times a day  (shake the bottle before)  Ofloxacin (tan top) four times a day    Maxitrol ointment 3 times per day     What to watch out for:  If you experience any of the following \"RSVP Symptoms\", you should call immediately:  Worsening Redness  Worsening Sensitivity to light  Worsening Vision, including new flashing lights or floaters  Worsening Pain, including nausea/vomiting    # Retinal detachment right eye   -RD repair " OD (multiple, last PPV/SO and SOR) 2005 & 2010 ([facility])  -status post Anterior chamber wash out to remove Silicone Oil bubble 9/5/23    # Retinal detachment left eye   -SBP OS 2014 (STEPHAN Strauss)  - Status post Macula hole repair left eye at [place] 2021    # 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 Almazan  - Pseudophacodonesis, stable for many years     # Cataract left eye   - Not VS    Return to clinic: POW1     Og Roman MD  PGY-6 Vitreo-retina surgery Fellow  Department of Ophthalmology   [facility]    ~~~~~~~~~~~~~~~~~~~~~~~~~~~~~~~~~~   Complete documentation of historical and exam elements from today's encounter can be found in the full encounter summary report (not reduplicated in this progress note).  I personally obtained the chief complaint(s) and history of present illness.  I confirmed and edited as necessary the review of systems, past medical/surgical history, family history, social history, and examination findings as documented by others; and I examined the patient myself.  I personally reviewed the relevant tests, images, and reports as documented above.  I formulated and edited as necessary the assessment and plan and discussed the findings and management plan with the patient and family    Marine Lizarraga MD  Professor of Ophthalmology  Vitreo-Retinal surgeon   Department of Ophthalmology and Visual Neurosciences   [facility]  Phone: [phone]   Fax: [phone]

## 2025-04-16 NOTE — LETTER
2025    Jalil Hernandez   2000    To Whom It May Concern,    I am writing to confirm that Jalil Hernandez has undergone a successful buckle removal procedure on 04/15/25. The patient has been recovering well and, following a recent examination, I am pleased to report that he is cleared to return to work.    It is recommended that Jalil Hernandez avoid any strenuous activities or heavy lifting for the next two weeks to ensure optimal healing. Regular work duties that do not involve these activities can be resumed starting 25    Please do not hesitate to contact my office at [phone] if you require any further information or clarification.    Thank you for your understanding and support of Jalil Hernandez during his recovery.        Marine Lizarraga MD

## 2025-04-16 NOTE — NURSING NOTE
"Chief Complaints and History of Present Illnesses   Patient presents with    Post Op (Ophthalmology) Left Eye     POD1 (Left) REMOVAL, LEFT SCLERAL BUCKLE     Chief Complaint(s) and History of Present Illness(es)       Post Op (Ophthalmology) Left Eye              Comments: POD1 (Left) REMOVAL, LEFT SCLERAL BUCKLE              Comments    Patient states surgery went well yesterday. No pain BE. No irritation, pt stated there is some \"gunk\" in his eye making it a little uncomfortable. No flashes of light or floaters.     Ocular Meds: (not started yet)  Prednisolone QID LE  Ofloxacin QID LE    Josefa Luci ROBERTS 8:32 AM April 16, 2025                      "

## 2025-04-16 NOTE — PATIENT INSTRUCTIONS
"Please follow the instructions below:    Medications to operative eye  Predforte  (pink top) four times a day  (shake the bottle before)  Ofloxacin (tan top) four times a day    Maxitrol ointment nightly    What to watch out for:  If you experience any of the following \"RSVP Symptoms\", you should call immediately:  Worsening Redness  Worsening Sensitivity to light  Worsening Vision, including new flashing lights or floaters  Worsening Pain, including nausea/vomiting  "

## 2025-04-23 ENCOUNTER — OFFICE VISIT (OUTPATIENT)
Dept: OPHTHALMOLOGY | Facility: CLINIC | Age: 25
End: 2025-04-23
Attending: OPHTHALMOLOGY
Payer: COMMERCIAL

## 2025-04-23 DIAGNOSIS — Z48.810 AFTERCARE FOLLOWING SURGERY OF A SENSORY ORGAN: Primary | ICD-10-CM

## 2025-04-23 PROCEDURE — G0463 HOSPITAL OUTPT CLINIC VISIT: HCPCS | Performed by: OPHTHALMOLOGY

## 2025-04-23 ASSESSMENT — REFRACTION_WEARINGRX
OD_AXIS: 103
OD_CYLINDER: +1.25
OS_CYLINDER: SPHERE
OS_SPHERE: -12.50
OD_SPHERE: -3.00

## 2025-04-23 ASSESSMENT — VISUAL ACUITY
OS_CC+: +1
OD_CC: 20/70
CORRECTION_TYPE: GLASSES
OS_PH_CC: 20/50
OD_CC+: -2
METHOD: SNELLEN - LINEAR
OS_PH_CC+: -1
OS_CC: 20/60

## 2025-04-23 ASSESSMENT — TONOMETRY
OD_IOP_MMHG: 15
OS_IOP_MMHG: 15
IOP_METHOD: TONOPEN

## 2025-04-23 ASSESSMENT — SLIT LAMP EXAM - LIDS: COMMENTS: NORMAL

## 2025-04-23 ASSESSMENT — CUP TO DISC RATIO
OD_RATIO: 0.2
OS_RATIO: 0.3

## 2025-04-23 ASSESSMENT — EXTERNAL EXAM - RIGHT EYE: OD_EXAM: NORMAL

## 2025-04-23 ASSESSMENT — EXTERNAL EXAM - LEFT EYE: OS_EXAM: NORMAL

## 2025-04-23 NOTE — PROGRESS NOTES
"CC:  S/P Buckle removal left eye     Interval: Patient reports that he had not pain, no foreign body sensation in the left eye. Doing well.       History of Present Illness:   Jalil Hernandez is a 24 year old patient referred by Dr Mcguire for left eye exposed buckle. He was originally seen by Dr Mcguire for evaluation of ocular hypertension. 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   He is being see by Dr. Lizarraga routinely and next appt is 5/18/25.  Jalil says that Dr. Lizarraga noted small exposure of scleral buckle left eye before and was watching it. He has not had any issues with it and when he looks in the mirror, he doesn't see any changes.     Relevant Past Medical/Family/Social History:  Noncontributory     Relevant Review of Systems: noncontributory      OCT 03/26/25   Right eye retina attached with remnants of PHF with tractions around macula and subhyaloid emulsified silicone oil  Left eye retina attached; irregular inner retina; subfoveal EZ disruption      A/P:   # Stickler's syndrome  Retina attached both eyes     #S/P removal of Scleral buckle left eye 4.15.25    Slept well  Retina attached  Doing well    Plan:  Position: As desired  No heavy lifting   Pappas shield at all times    Medications to operative eye  Predforte  (pink top) four times a day  (shake the bottle before)  Ofloxacin (tan top) four times a day    Maxitrol ointment 3 times per day     What to watch out for:  If you experience any of the following \"RSVP Symptoms\", you should call immediately:  Worsening Redness  Worsening Sensitivity to light  Worsening Vision, including new flashing lights or floaters  Worsening Pain, including nausea/vomiting    # Retinal detachment right eye   -RD repair " OD (multiple, last PPV/SO and SOR) 2005 & 2010 ([facility])  -status post Anterior chamber wash out to remove Silicone Oil bubble 9/5/23    # Retinal detachment left eye   -SBP OS 2014 (STEPHAN Strauss)  - Status post Macula hole repair left eye at [place] 2021    # 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 Almazan  - Pseudophacodonesis, stable for many years     # Cataract left eye   - Not VS    Return to clinic: POW1       ~~~~~~~~~~~~~~~~~~~~~~~~~~~~~~~~~~   Complete documentation of historical and exam elements from today's encounter can be found in the full encounter summary report (not reduplicated in this progress note).  I personally obtained the chief complaint(s) and history of present illness.  I confirmed and edited as necessary the review of systems, past medical/surgical history, family history, social history, and examination findings as documented by others; and I examined the patient myself.  I personally reviewed the relevant tests, images, and reports as documented above.  I formulated and edited as necessary the assessment and plan and discussed the findings and management plan with the patient and family    Marine Lizarraga MD  Professor of Ophthalmology  Vitreo-Retinal surgeon   Department of Ophthalmology and Visual Neurosciences   [facility]  Phone: [phone]   Fax: [phone]

## 2025-04-23 NOTE — LETTER
April 23, 2025      Re: Jalil Hernandez   2000    To Whom It May Concern:    To Whom It May Concern,    I am writing to confirm that Jalil Hernandez has undergone a successful buckle removal procedure on 04/15/25. The patient has been recovering well and, following a recent examination, I am pleased to report that he is cleared to return to work.    Jalil Hernandez is able to to resume all work functions with no restrictions  Please do not hesitate to contact my office at [phone] if you require any further information or clarification.    Thank you for your understanding and support of Jalil Hernandez during his recovery.      Sincerely,    Electronically signed  BENOIT PAULINO

## 2025-04-23 NOTE — PROGRESS NOTES
"CC: POW1 S/P Buckle removal left eye     Interval: Patient reports that he had not pain, no foreign body sensation in the left eye. Doing well.       History of Present Illness:  Jalil Hernandez is a 24 year old patient referred by Dr Mcguire for left eye exposed buckle. He was originally seen by Dr Mcguire for evaluation of ocular hypertension. 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   He is being see by Dr. Lizarraga routinely and next appt is 5/18/25.  Jalil says that Dr. Lizarraga noted small exposure of scleral buckle left eye before and was watching it. He has not had any issues with it and when he looks in the mirror, he doesn't see any changes.     Relevant Past Medical/Family/Social History:  Noncontributory     Relevant Review of Systems: noncontributory      OCT 03/26/25   Right eye retina attached with remnants of PHF with tractions around macula and subhyaloid emulsified silicone oil  Left eye retina attached; irregular inner retina; subfoveal EZ disruption      A/P: # Stickler's syndrome; Retina attached both eyes     #POW1 S/P removal of Scleral buckle left eye 4.15.25    Retina attached  Doing well    Plan:  Position: As desired  No restrictions  Pappas shield at all times    Medications to operative eye  Predforte  (pink top) Three times a day x 1 week, then twice a day x 1 week and then once a day till finish  (shake the bottle before)  Ofloxacin (tan top) ok to stop    What to watch out for:  If you experience any of the following \"RSVP Symptoms\", you should call immediately:  Worsening Redness  Worsening Sensitivity to light  Worsening Vision, including new flashing lights or floaters  Worsening Pain, including nausea/vomiting    # Retinal detachment right " eye   -RD repair OD (multiple, last PPV/SO and SOR) 2005 & 2010 ([facility])  -status post Anterior chamber wash out to remove Silicone Oil bubble 9/5/23    # Retinal detachment left eye   -SBP OS 2014 (STEPHAN Carlos A)  - Status post Macula hole repair left eye at [place] 2021    # 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 Almazan  - Pseudophacodonesis, stable for many years     # Cataract left eye   - Not VS    Return to clinic: POM1 with prescription Optical Coherence Tomography and optos    ~~~~~~~~~~~~~~~~~~~~~~~~~~~~~~~~~~   Complete documentation of historical and exam elements from today's encounter can be found in the full encounter summary report (not reduplicated in this progress note).  I personally obtained the chief complaint(s) and history of present illness.  I confirmed and edited as necessary the review of systems, past medical/surgical history, family history, social history, and examination findings as documented by others; and I examined the patient myself.  I personally reviewed the relevant tests, images, and reports as documented above.  I formulated and edited as necessary the assessment and plan and discussed the findings and management plan with the patient and family    Marine Lizarraga MD  Professor of Ophthalmology  Vitreo-Retinal surgeon   Department of Ophthalmology and Visual Neurosciences   [facility]  Phone: [phone]   Fax: [phone]

## 2025-04-23 NOTE — PATIENT INSTRUCTIONS
"  Predforte  (pink top) Three times a day x 1 week, then twice a day x 1 week and then once a day till finish  (shake the bottle before)  Ofloxacin (tan top) ok to stop    What to watch out for:  If you experience any of the following \"RSVP Symptoms\", you should call immediately:  Worsening Redness  Worsening Sensitivity to light  Worsening Vision, including new flashing lights or floaters  Worsening Pain, including nausea/vomiting  "

## 2025-04-23 NOTE — NURSING NOTE
Chief Complaints and History of Present Illnesses   Patient presents with    Post Op (Ophthalmology) Left Eye     S/P Buckle removal left eye 4/15/25     Chief Complaint(s) and History of Present Illness(es)       Post Op (Ophthalmology) Left Eye              Timing: in the morning    Comments: S/P Buckle removal left eye 4/15/25              Comments    Pt states no problems or concerns  No eye pain    Vianca Alvarez COT 8:26 AM April 23, 2025

## 2025-05-13 DIAGNOSIS — H35.342 MACULAR HOLE OF LEFT EYE: Primary | ICD-10-CM

## 2025-05-21 ENCOUNTER — TELEPHONE (OUTPATIENT)
Dept: OPHTHALMOLOGY | Facility: CLINIC | Age: 25
End: 2025-05-21
Payer: COMMERCIAL

## 2025-05-21 NOTE — TELEPHONE ENCOUNTER
Phone call from patient needing to reschedule his one month follow up due to getting dates mixed up . Writer was able to reschedule for 5/22/25 at 9:30 am         Leida Batista on 5/21/2025 at 8:40 AM

## 2025-05-22 ENCOUNTER — OFFICE VISIT (OUTPATIENT)
Dept: OPHTHALMOLOGY | Facility: CLINIC | Age: 25
End: 2025-05-22
Attending: OPHTHALMOLOGY
Payer: COMMERCIAL

## 2025-05-22 DIAGNOSIS — H35.342 MACULAR HOLE OF LEFT EYE: ICD-10-CM

## 2025-05-22 PROCEDURE — G0463 HOSPITAL OUTPT CLINIC VISIT: HCPCS | Performed by: OPHTHALMOLOGY

## 2025-05-22 PROCEDURE — 92250 FUNDUS PHOTOGRAPHY W/I&R: CPT | Performed by: OPHTHALMOLOGY

## 2025-05-22 PROCEDURE — 92134 CPTRZ OPH DX IMG PST SGM RTA: CPT | Performed by: OPHTHALMOLOGY

## 2025-05-22 ASSESSMENT — VISUAL ACUITY
OS_PH_CC+: +2
OS_CC: 20/50
CORRECTION_TYPE: GLASSES
OD_CC+: -2
OD_CC: 20/60
OS_PH_CC: 20/50
METHOD: SNELLEN - LINEAR

## 2025-05-22 ASSESSMENT — TONOMETRY
OS_IOP_MMHG: 18
IOP_METHOD: TONOPEN
OD_IOP_MMHG: 19

## 2025-05-22 ASSESSMENT — SLIT LAMP EXAM - LIDS
COMMENTS: NORMAL
COMMENTS: NORMAL

## 2025-05-22 ASSESSMENT — REFRACTION_WEARINGRX
OD_SPHERE: -3.00
OD_AXIS: 103
OS_CYLINDER: SPHERE
OS_SPHERE: -12.50
OD_CYLINDER: +1.25

## 2025-05-22 ASSESSMENT — CUP TO DISC RATIO
OS_RATIO: 0.3
OD_RATIO: 0.2

## 2025-05-22 ASSESSMENT — EXTERNAL EXAM - RIGHT EYE: OD_EXAM: NORMAL

## 2025-05-22 ASSESSMENT — EXTERNAL EXAM - LEFT EYE: OS_EXAM: NORMAL

## 2025-05-22 NOTE — NURSING NOTE
Chief Complaints and History of Present Illnesses   Patient presents with    Post Op (Ophthalmology) Left Eye      S/P Buckle removal left eye 4/15/25     Chief Complaint(s) and History of Present Illness(es)       Post Op (Ophthalmology) Left Eye              Comments:  S/P Buckle removal left eye 4/15/25              Comments    Pt states no new problems or concerns  No eye pain     Vianca Alvarez COT 9:29 AM May 22, 2025

## 2025-05-22 NOTE — PROGRESS NOTES
CC: POM1 S/P Buckle removal left eye     Interval: Feeling stable, no pain, no vision changes, no flashes, no floaters. He took final dose of prednisolone this morning.       History of Present Illness:  Jalil Hernandez is a 25 year old patient referred by Dr Mcguire for left eye exposed buckle. He was originally seen by Dr Mcguire for evaluation of ocular hypertension. 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   He is being see by Dr. Lizarraga routinely and next appt is 5/18/25.  Jalil says that Dr. Lizarraga noted small exposure of scleral buckle left eye before and was watching it. He has not had any issues with it and when he looks in the mirror, he doesn't see any changes.     Relevant Past Medical/Family/Social History:  Noncontributory     Relevant Review of Systems: noncontributory      OCT 05/22/25   Right eye retina attached with remnants of PHF with tractions around macula and subhyaloid emulsified silicone oil  Left eye retina attached; irregular inner retina; subfoveal EZ disruption - stable    Optos consistent with exam 05/22/25   Autofluorescence: peripheral areas of hypoautofluorescence both eyes consistent with scars    ASSESSMENT:     # Stickler's syndrome; Retina attached both eyes     #POM1 S/P removal of Scleral buckle left eye 4.15.25  Retina attached  Doing well  Off all drops  Prescription provided  Retinal detachment precautions provided    # Retinal detachment right eye   -RD repair OD (multiple, last PPV/SO and SOR) 2005 & 2010 ([facility])  -status post Anterior chamber wash out to remove Silicone Oil bubble 9/5/23    # Retinal detachment left eye   -SBP OS 2014 ([facility]); now status post removal   - Status post Macula hole repair left eye at [place] [facility]  2021    # 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 Almazan  - Pseudophacodonesis, stable for many years     # Cataract left eye   - Posterior subcapsular cataract (PSC) becoming visually significant     PLAN:   Retina detachment precautions were discussed with the patient (presence or increased in flashes, floaters or a curtain in the visual field) and was asked to return if any of the those occur   Return to clinic: 6 months with Optical Coherence Tomography and dilation    Segun Pimentel MD  PGY-3 Ophthalmology Resident  [facility]      ~~~~~~~~~~~~~~~~~~~~~~~~~~~~~~~~~~   Complete documentation of historical and exam elements from today's encounter can be found in the full encounter summary report (not reduplicated in this progress note).  I personally obtained the chief complaint(s) and history of present illness.  I confirmed and edited as necessary the review of systems, past medical/surgical history, family history, social history, and examination findings as documented by others; and I examined the patient myself.  I personally reviewed the relevant tests, images, and reports as documented above.  I formulated and edited as necessary the assessment and plan and discussed the findings and management plan with the patient and family    Marine Lizarraga MD  Professor of Ophthalmology  Vitreo-Retinal surgeon   Department of Ophthalmology and Visual Neurosciences   [facility]  Phone: [phone]   Fax: [phone]

## (undated) DEVICE — EYE KNIFE STILETTO VISITEC 1.1MM ANG 45DEG SIDEPORT 376620

## (undated) DEVICE — SYR 05ML LL W/O NDL

## (undated) DEVICE — TAPE MICROPORE 1"X1.5YD 1530S-1

## (undated) DEVICE — LINEN TOWEL PACK X5 5464

## (undated) DEVICE — EYE PACK CUSTOM ANTERIOR 30DEG TIP CENTURION PPK6682-04

## (undated) DEVICE — PACK CATARACT CUSTOM ASC SEY15CPUMC

## (undated) DEVICE — GLOVE BIOGEL PI MICRO SZ 6.0 48560

## (undated) DEVICE — SOL WATER IRRIG 500ML BOTTLE 2F7113

## (undated) DEVICE — PACK VITRECTOMY/RET CUSTOM ASC PQ15VRU12

## (undated) DEVICE — EYE KNIFE SLIT XSTAR VISITEC 2.5MM 45DEG BEVEL UP 373725

## (undated) DEVICE — EYE SHIELD PLASTIC

## (undated) DEVICE — EYE CANN IRR 27GA ANTERIOR CHAMBER 581280

## (undated) DEVICE — EYE TIP IRRIGATION & ASPIRATION POLYMER 35D BENT 8065751511

## (undated) DEVICE — TAPE MICROPORE 2"X1.5YD 1530S-2

## (undated) DEVICE — EYE CANN IRR 25GA CYSTOTOME 581610

## (undated) DEVICE — EYE NDL RETROBULBAR ATKINSON 25GA 1.5" 581637

## (undated) DEVICE — EYE KNIFE SLIT XSTAR VISITEC 2.6MM 45DEG 373726

## (undated) DEVICE — GLOVE PROTEXIS MICRO 6.5  2D73PM65

## (undated) DEVICE — EYE CANN IRR 20GA ACM LEWICKY 585061

## (undated) RX ORDER — PROPOFOL 10 MG/ML
INJECTION, EMULSION INTRAVENOUS
Status: DISPENSED
Start: 2023-09-05

## (undated) RX ORDER — GABAPENTIN 300 MG/1
CAPSULE ORAL
Status: DISPENSED
Start: 2018-12-18

## (undated) RX ORDER — ACETAMINOPHEN 325 MG/1
TABLET ORAL
Status: DISPENSED
Start: 2018-12-18

## (undated) RX ORDER — ACETAMINOPHEN 325 MG/1
TABLET ORAL
Status: DISPENSED
Start: 2025-04-15

## (undated) RX ORDER — ACETAMINOPHEN 325 MG/1
TABLET ORAL
Status: DISPENSED
Start: 2023-09-05

## (undated) RX ORDER — ONDANSETRON 2 MG/ML
INJECTION INTRAMUSCULAR; INTRAVENOUS
Status: DISPENSED
Start: 2018-12-18

## (undated) RX ORDER — PROPOFOL 10 MG/ML
INJECTION, EMULSION INTRAVENOUS
Status: DISPENSED
Start: 2025-04-15

## (undated) RX ORDER — ONDANSETRON 2 MG/ML
INJECTION INTRAMUSCULAR; INTRAVENOUS
Status: DISPENSED
Start: 2023-09-05

## (undated) RX ORDER — FENTANYL CITRATE 50 UG/ML
INJECTION, SOLUTION INTRAMUSCULAR; INTRAVENOUS
Status: DISPENSED
Start: 2018-12-18

## (undated) RX ORDER — PROPOFOL 10 MG/ML
INJECTION, EMULSION INTRAVENOUS
Status: DISPENSED
Start: 2018-12-18